# Patient Record
Sex: FEMALE | Race: WHITE | Employment: OTHER | URBAN - METROPOLITAN AREA
[De-identification: names, ages, dates, MRNs, and addresses within clinical notes are randomized per-mention and may not be internally consistent; named-entity substitution may affect disease eponyms.]

---

## 2017-02-06 ENCOUNTER — PATIENT MESSAGE (OUTPATIENT)
Dept: FAMILY MEDICINE CLINIC | Facility: CLINIC | Age: 72
End: 2017-02-06

## 2017-02-06 DIAGNOSIS — E03.9 ACQUIRED HYPOTHYROIDISM: Chronic | ICD-10-CM

## 2017-02-06 DIAGNOSIS — D50.0 IRON DEFICIENCY ANEMIA DUE TO CHRONIC BLOOD LOSS: Chronic | ICD-10-CM

## 2017-02-06 DIAGNOSIS — E78.5 HYPERLIPIDEMIA, UNSPECIFIED HYPERLIPIDEMIA TYPE: Primary | Chronic | ICD-10-CM

## 2017-02-06 NOTE — TELEPHONE ENCOUNTER
From: Jerald Gutierrez  To: Amrita Champagne MD  Sent: 2/6/2017 9:29 AM CST  Subject: Non-Urgent Medical Question    I believe I am having a thyroid problem. Currently my prescription is a 88MCG tablet Levothyroxine and the last blood test was 6/3/15.  For sever

## 2017-02-06 NOTE — TELEPHONE ENCOUNTER
LOV 3/24/16. Future Appointments  Date Time Provider Annmarie Prietoi   4/6/2017 11:00 AM April Dee MD [facility]   12/5/2017 10:50 AM Jami Ivey [facility]     Last thyroid labs were done 6/3/15.     Pt is concerned that she may be

## 2017-02-08 ENCOUNTER — LAB ENCOUNTER (OUTPATIENT)
Dept: LAB | Age: 72
End: 2017-02-08
Attending: FAMILY MEDICINE
Payer: MEDICARE

## 2017-02-08 DIAGNOSIS — D50.0 IRON DEFICIENCY ANEMIA DUE TO CHRONIC BLOOD LOSS: ICD-10-CM

## 2017-02-08 DIAGNOSIS — E03.9 ACQUIRED HYPOTHYROIDISM: Chronic | ICD-10-CM

## 2017-02-08 DIAGNOSIS — E78.5 HYPERLIPIDEMIA, UNSPECIFIED HYPERLIPIDEMIA TYPE: Chronic | ICD-10-CM

## 2017-02-08 LAB
ALBUMIN SERPL-MCNC: 4.4 G/DL (ref 3.5–4.8)
ALP LIVER SERPL-CCNC: 72 U/L (ref 55–142)
ALT SERPL-CCNC: 23 U/L (ref 14–54)
AST SERPL-CCNC: 16 U/L (ref 15–41)
BASOPHILS # BLD AUTO: 0.03 X10(3) UL (ref 0–0.1)
BASOPHILS NFR BLD AUTO: 0.7 %
BILIRUB SERPL-MCNC: 0.9 MG/DL (ref 0.1–2)
BUN BLD-MCNC: 20 MG/DL (ref 8–20)
CALCIUM BLD-MCNC: 9.6 MG/DL (ref 8.3–10.3)
CHLORIDE: 107 MMOL/L (ref 101–111)
CHOLEST SMN-MCNC: 146 MG/DL (ref ?–200)
CO2: 28 MMOL/L (ref 22–32)
CREAT BLD-MCNC: 0.82 MG/DL (ref 0.55–1.02)
EOSINOPHIL # BLD AUTO: 0.08 X10(3) UL (ref 0–0.3)
EOSINOPHIL NFR BLD AUTO: 1.8 %
ERYTHROCYTE [DISTWIDTH] IN BLOOD BY AUTOMATED COUNT: 12.4 % (ref 11.5–16)
FREE T4: 1.4 NG/DL (ref 0.9–1.8)
GLUCOSE BLD-MCNC: 90 MG/DL (ref 70–99)
HCT VFR BLD AUTO: 41.3 % (ref 34–50)
HDLC SERPL-MCNC: 102 MG/DL (ref 45–?)
HDLC SERPL: 1.43 {RATIO} (ref ?–4.44)
HGB BLD-MCNC: 14.1 G/DL (ref 12–16)
IMMATURE GRANULOCYTE COUNT: 0.01 X10(3) UL (ref 0–1)
IMMATURE GRANULOCYTE RATIO %: 0.2 %
LDLC SERPL CALC-MCNC: 33 MG/DL (ref ?–130)
LYMPHOCYTES # BLD AUTO: 1.23 X10(3) UL (ref 0.9–4)
LYMPHOCYTES NFR BLD AUTO: 28.3 %
M PROTEIN MFR SERPL ELPH: 6.9 G/DL (ref 6.1–8.3)
MCH RBC QN AUTO: 32.6 PG (ref 27–33.2)
MCHC RBC AUTO-ENTMCNC: 34.1 G/DL (ref 31–37)
MCV RBC AUTO: 95.4 FL (ref 81–100)
MONOCYTES # BLD AUTO: 0.43 X10(3) UL (ref 0.1–0.6)
MONOCYTES NFR BLD AUTO: 9.9 %
NEUTROPHIL ABS PRELIM: 2.57 X10 (3) UL (ref 1.3–6.7)
NEUTROPHILS # BLD AUTO: 2.57 X10(3) UL (ref 1.3–6.7)
NEUTROPHILS NFR BLD AUTO: 59.1 %
NONHDLC SERPL-MCNC: 44 MG/DL (ref ?–130)
PLATELET # BLD AUTO: 226 10(3)UL (ref 150–450)
POTASSIUM SERPL-SCNC: 4 MMOL/L (ref 3.6–5.1)
RBC # BLD AUTO: 4.33 X10(6)UL (ref 3.8–5.1)
RED CELL DISTRIBUTION WIDTH-SD: 43.5 FL (ref 35.1–46.3)
SODIUM SERPL-SCNC: 141 MMOL/L (ref 136–144)
TRIGLYCERIDES: 54 MG/DL (ref ?–150)
TSI SER-ACNC: 0.34 MIU/ML (ref 0.35–5.5)
VLDL: 11 MG/DL (ref 5–40)
WBC # BLD AUTO: 4.4 X10(3) UL (ref 4–13)

## 2017-03-15 ENCOUNTER — MED REC SCAN ONLY (OUTPATIENT)
Dept: FAMILY MEDICINE CLINIC | Facility: CLINIC | Age: 72
End: 2017-03-15

## 2017-03-22 ENCOUNTER — RT VISIT (OUTPATIENT)
Dept: RESPIRATORY THERAPY | Facility: HOSPITAL | Age: 72
End: 2017-03-22
Attending: INTERNAL MEDICINE
Payer: MEDICARE

## 2017-03-22 ENCOUNTER — HOSPITAL ENCOUNTER (OUTPATIENT)
Dept: GENERAL RADIOLOGY | Facility: HOSPITAL | Age: 72
Discharge: HOME | End: 2017-03-22
Attending: INTERNAL MEDICINE
Payer: MEDICARE

## 2017-03-22 DIAGNOSIS — J45.909 ASTHMATIC BRONCHITIS: ICD-10-CM

## 2017-03-22 DIAGNOSIS — R06.03 ACUTE RESPIRATORY DISTRESS: ICD-10-CM

## 2017-03-22 PROCEDURE — 94729 DIFFUSING CAPACITY: CPT

## 2017-03-22 PROCEDURE — 71020 XR CHEST PA + LAT CHEST (CPT=71020): CPT

## 2017-03-22 PROCEDURE — 94726 PLETHYSMOGRAPHY LUNG VOLUMES: CPT

## 2017-03-22 PROCEDURE — 94010 BREATHING CAPACITY TEST: CPT

## 2017-03-28 NOTE — PROCEDURES
Spirometry and flow volume loop appear normal with no evidence of airway obstruction     Spirometry and flow volume loop suggest restriction but the total lung capacity is within normal limits.   Normal TLC, no evidence of restriction  There is mild increas

## 2017-03-31 ENCOUNTER — TELEPHONE (OUTPATIENT)
Dept: FAMILY MEDICINE CLINIC | Facility: CLINIC | Age: 72
End: 2017-03-31

## 2017-03-31 NOTE — TELEPHONE ENCOUNTER
Patient completed her Annual Health Assessment Form and it is in triage for her appt with Nelly Parker on 4/6/17.

## 2017-04-06 ENCOUNTER — OFFICE VISIT (OUTPATIENT)
Dept: FAMILY MEDICINE CLINIC | Facility: CLINIC | Age: 72
End: 2017-04-06

## 2017-04-06 VITALS
HEIGHT: 67.5 IN | RESPIRATION RATE: 12 BRPM | DIASTOLIC BLOOD PRESSURE: 80 MMHG | HEART RATE: 60 BPM | BODY MASS INDEX: 19.24 KG/M2 | WEIGHT: 124 LBS | TEMPERATURE: 98 F | SYSTOLIC BLOOD PRESSURE: 112 MMHG

## 2017-04-06 DIAGNOSIS — Z23 NEED FOR VACCINATION: ICD-10-CM

## 2017-04-06 DIAGNOSIS — H81.13 BENIGN PAROXYSMAL POSITIONAL VERTIGO, BILATERAL: Chronic | ICD-10-CM

## 2017-04-06 DIAGNOSIS — H25.9 AGE-RELATED CATARACT OF BOTH EYES, UNSPECIFIED AGE-RELATED CATARACT TYPE: Chronic | ICD-10-CM

## 2017-04-06 DIAGNOSIS — L71.9 ROSACEA: ICD-10-CM

## 2017-04-06 DIAGNOSIS — J44.9 COPD, MILD (HCC): ICD-10-CM

## 2017-04-06 DIAGNOSIS — M81.0 OSTEOPOROSIS, UNSPECIFIED OSTEOPOROSIS TYPE, UNSPECIFIED PATHOLOGICAL FRACTURE PRESENCE: ICD-10-CM

## 2017-04-06 DIAGNOSIS — Z86.718 HISTORY OF VENOUS THROMBOSIS: ICD-10-CM

## 2017-04-06 DIAGNOSIS — E78.5 HYPERLIPIDEMIA, UNSPECIFIED HYPERLIPIDEMIA TYPE: Chronic | ICD-10-CM

## 2017-04-06 DIAGNOSIS — I10 HYPERTENSION GOAL BP (BLOOD PRESSURE) < 140/90: Chronic | ICD-10-CM

## 2017-04-06 DIAGNOSIS — N39.41 URGE INCONTINENCE OF URINE: Chronic | ICD-10-CM

## 2017-04-06 DIAGNOSIS — M81.0 AGE-RELATED OSTEOPOROSIS WITHOUT CURRENT PATHOLOGICAL FRACTURE: ICD-10-CM

## 2017-04-06 DIAGNOSIS — G89.29 CHRONIC MIDLINE LOW BACK PAIN WITHOUT SCIATICA: ICD-10-CM

## 2017-04-06 DIAGNOSIS — Z00.00 ANNUAL PHYSICAL EXAM: Primary | ICD-10-CM

## 2017-04-06 DIAGNOSIS — D50.0 IRON DEFICIENCY ANEMIA DUE TO CHRONIC BLOOD LOSS: Chronic | ICD-10-CM

## 2017-04-06 DIAGNOSIS — E03.9 ACQUIRED HYPOTHYROIDISM: Chronic | ICD-10-CM

## 2017-04-06 DIAGNOSIS — I73.9 PVD (PERIPHERAL VASCULAR DISEASE) (HCC): Chronic | ICD-10-CM

## 2017-04-06 DIAGNOSIS — I70.0 THORACIC AORTA ATHEROSCLEROSIS (HCC): ICD-10-CM

## 2017-04-06 DIAGNOSIS — I25.10 CORONARY ARTERY DISEASE INVOLVING NATIVE CORONARY ARTERY OF NATIVE HEART WITHOUT ANGINA PECTORIS: Chronic | ICD-10-CM

## 2017-04-06 DIAGNOSIS — Z00.00 ENCOUNTER FOR ANNUAL HEALTH EXAMINATION: ICD-10-CM

## 2017-04-06 DIAGNOSIS — M54.50 CHRONIC MIDLINE LOW BACK PAIN WITHOUT SCIATICA: ICD-10-CM

## 2017-04-06 PROCEDURE — G0009 ADMIN PNEUMOCOCCAL VACCINE: HCPCS | Performed by: FAMILY MEDICINE

## 2017-04-06 PROCEDURE — 96160 PT-FOCUSED HLTH RISK ASSMT: CPT | Performed by: FAMILY MEDICINE

## 2017-04-06 PROCEDURE — 90732 PPSV23 VACC 2 YRS+ SUBQ/IM: CPT | Performed by: FAMILY MEDICINE

## 2017-04-06 RX ORDER — LOSARTAN POTASSIUM 50 MG/1
50 TABLET ORAL DAILY
Qty: 90 TABLET | Refills: 3 | Status: SHIPPED | OUTPATIENT
Start: 2017-04-06 | End: 2018-04-11

## 2017-04-06 RX ORDER — LEVOTHYROXINE SODIUM 0.07 MG/1
75 TABLET ORAL DAILY
Qty: 90 TABLET | Refills: 3 | Status: SHIPPED | OUTPATIENT
Start: 2017-04-06 | End: 2018-03-30

## 2017-04-06 RX ORDER — MONTELUKAST SODIUM 10 MG/1
10 TABLET ORAL NIGHTLY
Qty: 90 TABLET | Refills: 3 | Status: SHIPPED | OUTPATIENT
Start: 2017-04-06 | End: 2018-04-11

## 2017-04-06 RX ORDER — ALBUTEROL SULFATE 90 UG/1
2 AEROSOL, METERED RESPIRATORY (INHALATION) EVERY 4 HOURS PRN
Qty: 18 G | Refills: 3 | Status: SHIPPED | OUTPATIENT
Start: 2017-04-06 | End: 2021-11-10

## 2017-04-06 RX ORDER — SIMVASTATIN 40 MG
40 TABLET ORAL NIGHTLY
Qty: 90 TABLET | Refills: 3 | Status: SHIPPED | OUTPATIENT
Start: 2017-04-06 | End: 2018-04-11

## 2017-04-06 RX ORDER — BACLOFEN 10 MG/1
10 TABLET ORAL 3 TIMES DAILY PRN
Qty: 30 TABLET | Refills: 0 | Status: SHIPPED | OUTPATIENT
Start: 2017-04-06 | End: 2017-05-06

## 2017-04-06 NOTE — PATIENT INSTRUCTIONS
Carney Shone Skrine's SCREENING SCHEDULE   Tests on this list are recommended by your physician but may not be covered, or covered at this frequency, by your insurer. Please check with your insurance carrier before scheduling to verify coverage.    PREVENTATIV a family history    Colorectal Cancer Screening  Covered up to Age 76     Colonoscopy Screen   Covered every 10 years- more often if abnormal Colonoscopy,10 Years due on 01/01/2016 Update Health Maintenance if applicable    Flex Sigmoidoscopy Screen  Cover -ADMIN INFLUENZA VIRUS VAC    Please get every year    Pneumococcal 13 (Prevnar)  Covered Once after 65   Orders placed or performed in visit on 05/28/15  -PNEUMOCOCCAL VACC, 13 CORY IM    Please get once after your 65th birthday    Pneumococcal 23 (Pneum

## 2017-04-06 NOTE — PROGRESS NOTES
HPI:   Javon Montalvo is a 66year old female who presents for a MA (Medicare Advantage) [facility] (Once per calendar year).     Doing fine, no new issues, and stable function  Her last annual assessment has been over 1 year: Annual Physical due on 03/24/2 Sodium 75 MCG Oral Tab Take 1 tablet (75 mcg total) by mouth daily. simvastatin 40 MG Oral Tab Take 1 tablet (40 mg total) by mouth nightly. Losartan Potassium (COZAAR) 50 MG Oral Tab Take 1 tablet (50 mg total) by mouth daily.    Montelukast Sodium 10 in her daughter; Hypertension in her maternal grandmother; Stroke in her maternal grandmother. SOCIAL HISTORY:   She  reports that she has never smoked. She has never used smokeless tobacco. She reports that she drinks alcohol.  She reports that she does appears well-developed and well-nourished. HENT:   Head: Normocephalic and atraumatic.    Right Ear: Tympanic membrane, external ear and ear canal normal.   Left Ear: Tympanic membrane, external ear and ear canal normal.   Nose: Nose normal.   Mouth/Throa PRESERVE FREE SINGLE DOSE (38650) FLU CLINIC 10/23/2015   • Depo-Medrol 40mg Inj 10/30/2013   • Influenza 11/03/2013   • Influenza Vaccine, High Dose, Preserv Free 12/03/2014, 10/10/2016   • Pneumococcal (Prevnar 13) 05/28/2015   • Pneumovax 23 (Lot Mgr) 1 ups, albuterol helps. Current Assessment & Plan     Stable, Continue present management.            Relevant Medications    Albuterol Sulfate HFA (PROAIR HFA) 108 (90 Base) MCG/ACT Inhalation Aero Soln       Endocrine    Hypothyroidism (Chronic) midline low back pain without sciatica         Relevant Medications     baclofen 10 MG Oral Tab     Other Relevant Orders     XR LUMBAR SPINE (MIN 4 VIEWS) (CPT=72110)     Encounter for annual health examination         Need for vaccination         Alisha Kemp without help    Preparing your meals: Able without help    Managing money/bills: Able without help    Taking medications as prescribed: Able without help    Are you able to afford your medications?: Yes    Hearing Problems?: No     Functional Status     He data found.     Fasting Blood Sugar (FSB)Annually   GLUCOSE (mg/dL)   Date Value   02/08/2017 90   06/03/2015 89   ----------       Cardiovascular Disease Screening     LDL Annually LDL CHOLESTEROL (mg/dL)   Date Value   02/08/2017 33     LDL-CHOLESTEROL (m No orders found for this or any previous visit. Tetanus No orders found for this or any previous visit.          SPECIFIC DISEASE MONITORING Internal Lab or Procedure External Lab or Procedure   Annual Monitoring of Persistent     Medications (ACE/ARB,

## 2017-04-07 NOTE — ASSESSMENT & PLAN NOTE
Stable, Continue present management.     Cholesterol Lowering Medications          simvastatin 40 MG Oral Tab

## 2017-04-07 NOTE — ASSESSMENT & PLAN NOTE
Stable, Continue present management.     Blood Pressure and Cardiac Medications          Losartan Potassium (COZAAR) 50 MG Oral Tab

## 2017-04-07 NOTE — ASSESSMENT & PLAN NOTE
Stable, Continue present management.     Thyroid  (most recent labs)     Lab Results  Component Value Date/Time   TSH 0.341* 02/08/2017 08:51 AM   T4F 1.4 02/08/2017 08:51 AM         Endocrine Medications          Levothyroxine Sodium 75 MCG Oral Tab

## 2017-04-12 ENCOUNTER — HOSPITAL ENCOUNTER (OUTPATIENT)
Dept: GENERAL RADIOLOGY | Facility: HOSPITAL | Age: 72
Discharge: HOME | End: 2017-04-12
Attending: FAMILY MEDICINE
Payer: MEDICARE

## 2017-04-12 DIAGNOSIS — G89.29 CHRONIC MIDLINE LOW BACK PAIN WITHOUT SCIATICA: ICD-10-CM

## 2017-04-12 DIAGNOSIS — M54.50 CHRONIC MIDLINE LOW BACK PAIN WITHOUT SCIATICA: ICD-10-CM

## 2017-04-12 DIAGNOSIS — M81.0 AGE-RELATED OSTEOPOROSIS WITHOUT CURRENT PATHOLOGICAL FRACTURE: ICD-10-CM

## 2017-04-12 PROCEDURE — 72110 X-RAY EXAM L-2 SPINE 4/>VWS: CPT

## 2017-04-28 ENCOUNTER — HOSPITAL ENCOUNTER (OUTPATIENT)
Dept: CT IMAGING | Facility: HOSPITAL | Age: 72
Discharge: HOME | End: 2017-04-28
Attending: INTERNAL MEDICINE
Payer: MEDICARE

## 2017-04-28 DIAGNOSIS — R06.00 DYSPNEA: ICD-10-CM

## 2017-04-28 PROCEDURE — 71250 CT THORAX DX C-: CPT

## 2017-06-28 ENCOUNTER — PATIENT MESSAGE (OUTPATIENT)
Dept: FAMILY MEDICINE CLINIC | Facility: CLINIC | Age: 72
End: 2017-06-28

## 2017-06-28 DIAGNOSIS — E03.9 ACQUIRED HYPOTHYROIDISM: Chronic | ICD-10-CM

## 2017-06-28 DIAGNOSIS — Z12.31 ENCOUNTER FOR SCREENING MAMMOGRAM FOR BREAST CANCER: Primary | ICD-10-CM

## 2017-06-28 NOTE — TELEPHONE ENCOUNTER
From: Oksana Gore  To: Alesha Chavis MD  Sent: 6/28/2017 1:32 PM CDT  Subject: Prescription Question    My last visit my prescription was changed to Levothyroxine 75 Mcg and recommendation was to have another blood test when refill was close to refill.

## 2017-07-05 ENCOUNTER — APPOINTMENT (OUTPATIENT)
Dept: LAB | Age: 72
End: 2017-07-05
Attending: FAMILY MEDICINE
Payer: MEDICARE

## 2017-07-05 DIAGNOSIS — E03.9 ACQUIRED HYPOTHYROIDISM: Primary | Chronic | ICD-10-CM

## 2017-07-05 DIAGNOSIS — E03.9 ACQUIRED HYPOTHYROIDISM: Chronic | ICD-10-CM

## 2017-07-05 LAB
FREE T4: 1.4 NG/DL (ref 0.9–1.8)
TSI SER-ACNC: 0.66 MIU/ML (ref 0.35–5.5)

## 2017-07-05 PROCEDURE — 36415 COLL VENOUS BLD VENIPUNCTURE: CPT | Performed by: FAMILY MEDICINE

## 2017-07-11 PROBLEM — H26.493 OTHER SECONDARY CATARACT, BILATERAL: Status: ACTIVE | Noted: 2017-07-11

## 2017-07-11 PROBLEM — H16.223 KERATOCONJUNCTIVITIS SICCA OF BOTH EYES NOT SPECIFIED AS SJOGREN'S: Status: ACTIVE | Noted: 2017-07-11

## 2017-07-11 PROBLEM — H35.033 HYPERTENSIVE RETINOPATHY OF BOTH EYES: Status: ACTIVE | Noted: 2017-07-11

## 2017-07-12 ENCOUNTER — MED REC SCAN ONLY (OUTPATIENT)
Dept: FAMILY MEDICINE CLINIC | Facility: CLINIC | Age: 72
End: 2017-07-12

## 2017-07-19 ENCOUNTER — PATIENT OUTREACH (OUTPATIENT)
Dept: CASE MANAGEMENT | Age: 72
End: 2017-07-19

## 2017-07-24 ENCOUNTER — PATIENT OUTREACH (OUTPATIENT)
Dept: CASE MANAGEMENT | Age: 72
End: 2017-07-24

## 2017-08-03 ENCOUNTER — HOSPITAL ENCOUNTER (OUTPATIENT)
Dept: MAMMOGRAPHY | Age: 72
Discharge: HOME | End: 2017-08-03
Attending: FAMILY MEDICINE
Payer: MEDICARE

## 2017-08-03 DIAGNOSIS — Z12.31 ENCOUNTER FOR SCREENING MAMMOGRAM FOR BREAST CANCER: ICD-10-CM

## 2017-08-03 PROCEDURE — 77067 SCR MAMMO BI INCL CAD: CPT | Performed by: FAMILY MEDICINE

## 2017-08-16 ENCOUNTER — PATIENT OUTREACH (OUTPATIENT)
Dept: CASE MANAGEMENT | Age: 72
End: 2017-08-16

## 2017-08-16 NOTE — PROGRESS NOTES
Returned pt's  Iona Clement (HIPPA) call introduced [place] [facility] [facility] noted of the letter the was sent with my information and we scheduled assessment.   Total time spent with patient including chart review: 5   Time spent with patient t

## 2017-08-29 ENCOUNTER — PATIENT OUTREACH (OUTPATIENT)
Dept: CASE MANAGEMENT | Age: 72
End: 2017-08-29

## 2017-08-29 NOTE — PROGRESS NOTES
Rescheduled assessment to 8/31/2017.    Total time spent with patient including chart review: 3  Time spent with patient this month: 8    Total time spent with communication and chart review this month to date: 8

## 2017-08-31 ENCOUNTER — PATIENT OUTREACH (OUTPATIENT)
Dept: CASE MANAGEMENT | Age: 72
End: 2017-08-31

## 2017-08-31 DIAGNOSIS — E78.5 HYPERLIPIDEMIA, UNSPECIFIED HYPERLIPIDEMIA TYPE: Chronic | ICD-10-CM

## 2017-08-31 DIAGNOSIS — J44.9 COPD, MILD (HCC): ICD-10-CM

## 2017-08-31 DIAGNOSIS — M81.0 OSTEOPOROSIS, UNSPECIFIED OSTEOPOROSIS TYPE, UNSPECIFIED PATHOLOGICAL FRACTURE PRESENCE: ICD-10-CM

## 2017-08-31 DIAGNOSIS — I10 HYPERTENSION GOAL BP (BLOOD PRESSURE) < 140/90: Chronic | ICD-10-CM

## 2017-08-31 DIAGNOSIS — I25.10 CORONARY ARTERY DISEASE INVOLVING NATIVE CORONARY ARTERY OF NATIVE HEART WITHOUT ANGINA PECTORIS: Chronic | ICD-10-CM

## 2017-08-31 PROCEDURE — 99490 CHRNC CARE MGMT STAFF 1ST 20: CPT

## 2017-08-31 NOTE — PROGRESS NOTES
I want to know a little about you. • What do you do for fun? Gardening and growing fruit and veggies, babysitting there grandson. • Any hobbies? What Hobbies? Gardening, cooking and baking dog bones. • What do you do for work?  Retired     Social suppo Yes   • Do you want to work on incorporating more movement or exercise into your daily routine?  No not at this time.   o (skip to goals again if so)    Disease specific:   • Do you feel you have a good understanding of your _yes__ (dm, HTN, etc)  • Would y Osteoporosis     Keratoconjunctivitis sicca of both eyes not specified as Sjogren's     Other secondary cataract, bilateral     Hypertensive retinopathy of both eyes      Interventions for following categories based on assessment  Health Maintenance: pt re and walking. Care Plan: Reviewed and updated where needed  Time Spent This Encounter Total: 31 min medical record review, telephone communication, care plan updates where needed, and education.  Provided acknowledgment and validation to patient's concerns

## 2017-09-22 ENCOUNTER — PATIENT OUTREACH (OUTPATIENT)
Dept: CASE MANAGEMENT | Age: 72
End: 2017-09-22

## 2017-09-22 DIAGNOSIS — J44.9 COPD, MILD (HCC): ICD-10-CM

## 2017-09-22 DIAGNOSIS — M81.0 OSTEOPOROSIS, UNSPECIFIED OSTEOPOROSIS TYPE, UNSPECIFIED PATHOLOGICAL FRACTURE PRESENCE: ICD-10-CM

## 2017-09-22 PROCEDURE — 99490 CHRNC CARE MGMT STAFF 1ST 20: CPT

## 2017-09-22 NOTE — PROGRESS NOTES
9/22/2017  Spoke to Mandy Evergreen at length about CCM, current care plan and reviewed meds and compliance.  Reviewed Patient Active Problem List:     Benign paroxysmal positional vertigo     COPD, mild (HCC)     Hyperlipidemia     Hypertension goal BP (blood pressu effects.     Your appointments     Date & Time Appointment Department [facility])    Dec 05, 2017 10:50 AM CST FOLLOW UP with MD LUIS Villarreal CARDIOLOGY [place] at [facility]        [address] at [facility] [address] Condetiffany Laboy

## 2017-10-24 ENCOUNTER — PATIENT OUTREACH (OUTPATIENT)
Dept: CASE MANAGEMENT | Age: 72
End: 2017-10-24

## 2017-11-07 ENCOUNTER — PATIENT OUTREACH (OUTPATIENT)
Dept: CASE MANAGEMENT | Age: 72
End: 2017-11-07

## 2018-03-30 DIAGNOSIS — E03.9 ACQUIRED HYPOTHYROIDISM: Chronic | ICD-10-CM

## 2018-04-02 RX ORDER — LEVOTHYROXINE SODIUM 0.07 MG/1
TABLET ORAL
Qty: 90 TABLET | Refills: 0 | Status: SHIPPED | OUTPATIENT
Start: 2018-04-02 | End: 2018-04-11

## 2018-04-02 NOTE — TELEPHONE ENCOUNTER
Incoming request for levothyroxine. LOV 4/6/2017 and last labs done 7/5/2017.  Future Appointments  Date Time Provider Annmarie Heidi   4/11/2018 10:00 AM Carmencita Omalley MD [facility]   12/11/2018 10:50 AM Yocasta Gant  [address]

## 2018-04-05 ENCOUNTER — TELEPHONE (OUTPATIENT)
Dept: FAMILY MEDICINE CLINIC | Facility: CLINIC | Age: 73
End: 2018-04-05

## 2018-04-05 NOTE — TELEPHONE ENCOUNTER
Spoke with patient and she said she was just running out to get her grandson she is available from 9am - 2 pm please call back tomorrow during those times.

## 2018-04-09 PROBLEM — M47.816 FACET ARTHRITIS OF LUMBAR REGION: Status: ACTIVE | Noted: 2018-04-09

## 2018-04-09 PROBLEM — H26.493 OTHER SECONDARY CATARACT, BILATERAL: Status: RESOLVED | Noted: 2017-07-11 | Resolved: 2018-04-09

## 2018-04-10 NOTE — ASSESSMENT & PLAN NOTE
Stable, Continue present management.     Cholesterol Lowering Medications          simvastatin 40 MG Oral Tab        Continue to follow

## 2018-04-10 NOTE — ASSESSMENT & PLAN NOTE
Stable, Continue present management.     Thyroid  (most recent labs)     Lab Results  Component Value Date/Time   TSH 0.656 07/05/2017 11:22 AM   T4F 1.4 07/05/2017 11:22 AM         Endocrine Medications          LEVOTHYROXINE SODIUM 75 MCG Oral Tab

## 2018-04-11 ENCOUNTER — OFFICE VISIT (OUTPATIENT)
Dept: FAMILY MEDICINE CLINIC | Facility: CLINIC | Age: 73
End: 2018-04-11

## 2018-04-11 VITALS
RESPIRATION RATE: 14 BRPM | SYSTOLIC BLOOD PRESSURE: 106 MMHG | BODY MASS INDEX: 18.64 KG/M2 | WEIGHT: 123 LBS | TEMPERATURE: 97 F | DIASTOLIC BLOOD PRESSURE: 66 MMHG | HEART RATE: 72 BPM | HEIGHT: 68 IN

## 2018-04-11 DIAGNOSIS — M47.816 FACET ARTHRITIS OF LUMBAR REGION: ICD-10-CM

## 2018-04-11 DIAGNOSIS — H35.033 HYPERTENSIVE RETINOPATHY OF BOTH EYES: ICD-10-CM

## 2018-04-11 DIAGNOSIS — L71.9 ROSACEA: ICD-10-CM

## 2018-04-11 DIAGNOSIS — E78.00 PURE HYPERCHOLESTEROLEMIA: Chronic | ICD-10-CM

## 2018-04-11 DIAGNOSIS — N39.41 URGE INCONTINENCE OF URINE: Chronic | ICD-10-CM

## 2018-04-11 DIAGNOSIS — H16.223 KERATOCONJUNCTIVITIS SICCA OF BOTH EYES NOT SPECIFIED AS SJOGREN'S: ICD-10-CM

## 2018-04-11 DIAGNOSIS — I70.0 THORACIC AORTA ATHEROSCLEROSIS (HCC): ICD-10-CM

## 2018-04-11 DIAGNOSIS — H25.9 AGE-RELATED CATARACT OF BOTH EYES, UNSPECIFIED AGE-RELATED CATARACT TYPE: Chronic | ICD-10-CM

## 2018-04-11 DIAGNOSIS — Z12.31 VISIT FOR SCREENING MAMMOGRAM: ICD-10-CM

## 2018-04-11 DIAGNOSIS — I10 HYPERTENSION GOAL BP (BLOOD PRESSURE) < 140/90: Chronic | ICD-10-CM

## 2018-04-11 DIAGNOSIS — H81.13 BENIGN PAROXYSMAL POSITIONAL VERTIGO DUE TO BILATERAL VESTIBULAR DISORDER: Chronic | ICD-10-CM

## 2018-04-11 DIAGNOSIS — M81.0 AGE-RELATED OSTEOPOROSIS WITHOUT CURRENT PATHOLOGICAL FRACTURE: ICD-10-CM

## 2018-04-11 DIAGNOSIS — D50.0 IRON DEFICIENCY ANEMIA DUE TO CHRONIC BLOOD LOSS: Chronic | ICD-10-CM

## 2018-04-11 DIAGNOSIS — Z86.718 HISTORY OF VENOUS THROMBOSIS: ICD-10-CM

## 2018-04-11 DIAGNOSIS — I25.10 CORONARY ARTERY DISEASE INVOLVING NATIVE CORONARY ARTERY OF NATIVE HEART WITHOUT ANGINA PECTORIS: Chronic | ICD-10-CM

## 2018-04-11 DIAGNOSIS — Z00.00 ANNUAL PHYSICAL EXAM: Primary | ICD-10-CM

## 2018-04-11 DIAGNOSIS — E03.9 ACQUIRED HYPOTHYROIDISM: Chronic | ICD-10-CM

## 2018-04-11 DIAGNOSIS — J44.9 COPD, MILD (HCC): ICD-10-CM

## 2018-04-11 PROBLEM — R06.03 ACUTE RESPIRATORY DISTRESS: Status: RESOLVED | Noted: 2018-04-11 | Resolved: 2018-04-11

## 2018-04-11 PROBLEM — R06.03 ACUTE RESPIRATORY DISTRESS: Status: ACTIVE | Noted: 2018-04-11

## 2018-04-11 PROCEDURE — G0439 PPPS, SUBSEQ VISIT: HCPCS | Performed by: FAMILY MEDICINE

## 2018-04-11 PROCEDURE — 96160 PT-FOCUSED HLTH RISK ASSMT: CPT | Performed by: FAMILY MEDICINE

## 2018-04-11 RX ORDER — SIMVASTATIN 40 MG
40 TABLET ORAL NIGHTLY
Qty: 90 TABLET | Refills: 3 | Status: SHIPPED | OUTPATIENT
Start: 2018-04-11 | End: 2019-04-16

## 2018-04-11 RX ORDER — MONTELUKAST SODIUM 10 MG/1
10 TABLET ORAL NIGHTLY
Qty: 90 TABLET | Refills: 3 | Status: SHIPPED | OUTPATIENT
Start: 2018-04-11 | End: 2019-04-11

## 2018-04-11 RX ORDER — LEVOTHYROXINE SODIUM 0.07 MG/1
75 TABLET ORAL
Qty: 90 TABLET | Refills: 3 | Status: SHIPPED | OUTPATIENT
Start: 2018-04-11 | End: 2019-06-21

## 2018-04-11 RX ORDER — ERGOCALCIFEROL 1.25 MG/1
50000 CAPSULE ORAL
Qty: 3 CAPSULE | Status: SHIPPED | OUTPATIENT
Start: 2018-04-11 | End: 2019-04-11

## 2018-04-11 RX ORDER — LOSARTAN POTASSIUM 50 MG/1
50 TABLET ORAL DAILY
Qty: 90 TABLET | Refills: 3 | Status: SHIPPED | OUTPATIENT
Start: 2018-04-11 | End: 2019-05-07

## 2018-04-11 NOTE — PROGRESS NOTES
HPI:   Deonte Bo is a 74year old female who presents for a MA (Medicare Advantage) [facility] (Once per calendar year). Cold all the time.  Nl thyroid and circulation  Her last annual assessment has been over 1 year: Annual Physical due on 04/06/20 takes aspirin and has it on her medication list.   CAGE Alcohol screening   Tiffany Hernandez was screened for Alcohol abuse and had a score of 0 so is at low risk.     Patient Care Team: Patient Care Team:  Carmencita Omalley MD as PCP - Aspen Guillory MD [salmeterol xinafoate]; sulfa antibiotics; symbicort; triamterene; and nexium.     CURRENT MEDICATIONS:     Outpatient Prescriptions Marked as Taking for the 4/11/18 encounter (Office Visit) with Aarti Stevens MD:  ergocalciferol 13877 units Oral Cap Take 1 surgery (0271,1776); arthroscopy of joint unlisted (06/2010); Cardiac catheterization (5/2010); elisa needle localization w/ specimen 1 site right (Right, 1980); and elisa needle localization w/ specimen 2 site right (Right, 1993).     Her family history includ Whisper Test  Whisper Test Result:  Fail             Visual Acuity  Right Eye Visual Acuity: Corrected Right Eye Chart Acuity: 20/40   Left Eye Visual Acuity: Corrected Left Eye Chart Acuity: 20/50   Both Eyes Visual Acuity: Corrected Both Eyes Chart Acuit deficit. Skin: Skin is warm, dry and intact. No rash noted. She is not diaphoretic. No cyanosis or erythema. Nails show no clubbing. Psychiatric: She has a normal mood and affect.  Her speech is normal and behavior is normal. Judgment and thought conten Relevant Orders    CARDIO - INTERNAL    Thoracic aorta atherosclerosis ([address]    Overview     CT angiogram showed mild thoracic atherosclerosis in 2015 as well as hx of Occluded rt sc artery.           Current Assessment & Plan     Stable, Continue present m Dr. John Davis, Continue present management.            Relevant Orders    OPHTHALMOLOGY - INTERNAL       Integumentary    Rosacea    Overview     Metronidazole 0.75% cream PRB         Current Assessment & Plan flowsheet data found.     Fasting Blood Sugar (FSB)Annually   Glucose (mg/dL)   Date Value   02/08/2017 90   ----------  GLUCOSE (mg/dL)   Date Value   06/03/2015 89   ----------       Cardiovascular Disease Screening     LDL Annually LDL-CHOLESTEROL (mg/dL Hepatitis B for Moderate/High Risk No vaccine history found Medium/high risk factors:   End-stage renal disease   Hemophiliacs who received Factor VIII or IX concentrates   Clients of institutions for the mentally retarded   Persons who live in the same [place]

## 2018-04-11 NOTE — PATIENT INSTRUCTIONS
TOP FALL PREVENTION TIPS    INSIDE YOUR HOME   KITCHEN:  · Use non skid mats only. · Clean up spills as soon as they happen. · Keep objects that you use often within easy reach.   BATHROOM:  · Install grab bars on the bathroom walls beside the tub, sh HbgA1C    At Least  Annually for Diabetics No results found for: A1C No flowsheet data found.     Fasting Blood Sugar (FSB)   Patient must be diagnosed with one of the following:   • Hypertension   • Dyslipidemia   • Obesity (BMI ³30 kg/m2)   • Previous Covered every 5 years No results found for this or any previous visit. No flowsheet data found. Fecal Occult Blood   Covered Annually No results found for: FOB, OCCULTSTOOL No flowsheet data found.      Barium Enema-   uncomfortable but covered  Covere (Pneumovax)  Covered Once after 65   Orders placed or performed in visit on 04/06/17  -PNEUMOCOCCAL IMM (PNEUMOVAX)    Please get once after your 65th birthday    Hepatitis B for Moderate/High Risk       No orders found for this or any previous visit.  Medi Lab or Procedure External Lab or Procedure   Diabetes Screening      HbgA1C    At Least  Annually for Diabetics No results found for: A1C No flowsheet data found.     Fasting Blood Sugar (FSB)   Patient must be diagnosed with one of the following:   • Hyper Update Health Maintenance if applicable    Flex Sigmoidoscopy Screen  Covered every 5 years No results found for this or any previous visit. No flowsheet data found.      Fecal Occult Blood   Covered Annually No results found for: FOB, OCCULTSTOOL No flowsh IM    Please get once after your 65th birthday    Pneumococcal 23 (Pneumovax)  Covered Once after 65   Orders placed or performed in visit on 04/06/17  -PNEUMOCOCCAL IMM (PNEUMOVAX)    Please get once after your 65th birthday    Hepatitis B for Moderate/Hi

## 2018-08-04 ENCOUNTER — HOSPITAL ENCOUNTER (OUTPATIENT)
Dept: MAMMOGRAPHY | Age: 73
Discharge: HOME | End: 2018-08-04
Attending: FAMILY MEDICINE
Payer: MEDICARE

## 2018-08-04 DIAGNOSIS — Z12.31 VISIT FOR SCREENING MAMMOGRAM: ICD-10-CM

## 2018-08-04 PROCEDURE — 77067 SCR MAMMO BI INCL CAD: CPT | Performed by: FAMILY MEDICINE

## 2018-08-04 PROCEDURE — 77063 BREAST TOMOSYNTHESIS BI: CPT | Performed by: FAMILY MEDICINE

## 2018-11-04 ENCOUNTER — PATIENT MESSAGE (OUTPATIENT)
Dept: FAMILY MEDICINE CLINIC | Facility: CLINIC | Age: 73
End: 2018-11-04

## 2018-11-04 DIAGNOSIS — Z12.11 COLON CANCER SCREENING: Primary | ICD-10-CM

## 2018-11-05 NOTE — TELEPHONE ENCOUNTER
From: Maxine Cash  To: Aminta Goddard MD  Sent: 11/4/2018 10:46 AM CST  Subject: Non-Urgent Medical Question    Influenza injection was administered at [facility] 10/10/2018  Never knew I was to have a stool sample tested

## 2018-11-26 ENCOUNTER — OFFICE VISIT (OUTPATIENT)
Dept: FAMILY MEDICINE CLINIC | Facility: CLINIC | Age: 73
End: 2018-11-26

## 2018-11-26 VITALS
RESPIRATION RATE: 12 BRPM | HEIGHT: 66 IN | HEART RATE: 56 BPM | BODY MASS INDEX: 19.93 KG/M2 | DIASTOLIC BLOOD PRESSURE: 78 MMHG | WEIGHT: 124 LBS | SYSTOLIC BLOOD PRESSURE: 126 MMHG | TEMPERATURE: 98 F

## 2018-11-26 DIAGNOSIS — M25.562 ACUTE PAIN OF LEFT KNEE: ICD-10-CM

## 2018-11-26 DIAGNOSIS — Z13.21 ENCOUNTER FOR VITAMIN DEFICIENCY SCREENING: Primary | ICD-10-CM

## 2018-11-26 DIAGNOSIS — G89.29 CHRONIC LEFT HIP PAIN: ICD-10-CM

## 2018-11-26 DIAGNOSIS — M25.552 CHRONIC LEFT HIP PAIN: ICD-10-CM

## 2018-11-26 DIAGNOSIS — K13.0 CHEILITIS: ICD-10-CM

## 2018-11-26 DIAGNOSIS — Z91.81 AT HIGH RISK FOR FALLS: ICD-10-CM

## 2018-11-26 PROCEDURE — 99214 OFFICE O/P EST MOD 30 MIN: CPT | Performed by: FAMILY MEDICINE

## 2018-11-26 NOTE — PROGRESS NOTES
Kevin Folres is a 74year old female. S:  Patient presents today with the following concerns:  · Left shoulder pain 2 months ago and then went away. · Left hip pain-tender spot to the touch. Some pain at night when rolls over.     · Then left knee st Osteoporosis     Keratoconjunctivitis sicca of both eyes not specified as Sjogren's     Hypertensive retinopathy of both eyes     Facet arthritis of lumbar region [facility])    Family History   Problem Relation Age of Onset   • Diabetes Sister    • Breast Cance Consults:  XR KNEE BILAT STANDING (CPT=73565)  XR HIP W OR WO PELVIS 2 VIEWS BILAT(CPT=73521)    Labs ordered as above due to her lip pain. Recommend Aquaphor oint in the meantime. X-rays ordered. Likely arthritis. Trial of glucosamine chondroitin.

## 2018-11-26 NOTE — PATIENT INSTRUCTIONS
Trial of glucosamine chondroitin. Aquaphor ointment for lips. Knee Pain  Knee pain is very common. It’s especially common in active people who put a lot of pressure on their knees, like runners. It affects women more often than men.   Your kneec also sometimes feel like your knee is giving out. You may have symptoms in one or both of your knees. Diagnosing knee pain  Your healthcare provider will ask about your medical history and your symptoms.  Be sure to describe any activities that make your k and after exercise  · Replace your running shoes regularly  · Lose excess weight     When to call your healthcare provider  Call your healthcare provider right away if:  · Your symptoms don’t get better after a few weeks of treatment  · You have any new sy program.  · Use anti-inflammatory medicines as prescribed for pain. This includes acetaminophen or NSAIDs such as ibuprofen or naproxen. If needed, topical or injected medicines may be recommended.  Talk to your healthcare provider if these options are not sports, such as walking, biking, or doing exercises in a warm pool. · Most people should exercise for at least 30 minutes a day on most days of the week. This can be broken up into shorter periods throughout the day.   · Don’t push yourself too hard at [facility] NSAIDs (nonsteroidal anti-inflammatory medicines) such as ibuprofen and naproxen, or opioid narcotics  · Injections in affected joints such as corticosteroids in various joints, or hyaluronic acid in the knee joints  · Surgical repair or surgical joint rep

## 2018-11-28 ENCOUNTER — HOSPITAL ENCOUNTER (OUTPATIENT)
Dept: GENERAL RADIOLOGY | Age: 73
Discharge: HOME | End: 2018-11-28
Attending: FAMILY MEDICINE
Payer: MEDICARE

## 2018-11-28 ENCOUNTER — LAB ENCOUNTER (OUTPATIENT)
Dept: LAB | Age: 73
End: 2018-11-28
Attending: FAMILY MEDICINE
Payer: MEDICARE

## 2018-11-28 DIAGNOSIS — M25.562 ACUTE PAIN OF LEFT KNEE: ICD-10-CM

## 2018-11-28 DIAGNOSIS — K13.0 CHEILITIS: ICD-10-CM

## 2018-11-28 DIAGNOSIS — M25.552 CHRONIC LEFT HIP PAIN: ICD-10-CM

## 2018-11-28 DIAGNOSIS — G89.29 CHRONIC LEFT HIP PAIN: ICD-10-CM

## 2018-11-28 DIAGNOSIS — Z13.21 ENCOUNTER FOR VITAMIN DEFICIENCY SCREENING: ICD-10-CM

## 2018-11-28 DIAGNOSIS — Z91.81 AT HIGH RISK FOR FALLS: ICD-10-CM

## 2018-11-28 PROCEDURE — 73565 X-RAY EXAM OF KNEES: CPT | Performed by: FAMILY MEDICINE

## 2018-11-28 PROCEDURE — 82607 VITAMIN B-12: CPT

## 2018-11-28 PROCEDURE — 73523 X-RAY EXAM HIPS BI 5/> VIEWS: CPT | Performed by: FAMILY MEDICINE

## 2018-11-28 PROCEDURE — 82306 VITAMIN D 25 HYDROXY: CPT

## 2018-11-28 PROCEDURE — 36415 COLL VENOUS BLD VENIPUNCTURE: CPT

## 2018-12-03 ENCOUNTER — TELEPHONE (OUTPATIENT)
Dept: FAMILY MEDICINE CLINIC | Facility: CLINIC | Age: 73
End: 2018-12-03

## 2018-12-03 DIAGNOSIS — G89.29 CHRONIC LEFT HIP PAIN: ICD-10-CM

## 2018-12-03 DIAGNOSIS — M25.552 CHRONIC LEFT HIP PAIN: ICD-10-CM

## 2018-12-03 DIAGNOSIS — M25.562 ACUTE PAIN OF LEFT KNEE: Primary | ICD-10-CM

## 2018-12-03 NOTE — TELEPHONE ENCOUNTER
----- Message from Chrissy Erwin PA-C sent at 11/28/2018 12:42 PM CST -----  Mild osteoarthritis. Pelvic tilt with right hip higher than left. This could be related to a scoliosis or leg length discrepancy.   Let's refer her to Dr. Mega Foote for [facility]

## 2018-12-05 NOTE — TELEPHONE ENCOUNTER
Patient notified of approved referral to Dr. Jannie Fung M.D. Patient was also given his [place] phone number to call.

## 2018-12-05 NOTE — TELEPHONE ENCOUNTER
Referral request Dr. Eloy Jones M.D.,Orthopedic surgery  Patient seen by Bambi Sharma PA-C 11/26/18  Office notes from MAXINE DumasC11/26/18  Imaging & Consults:  XR KNEE BILAT STANDING (CPT=73565)  XR HIP W OR WO PELVIS 2 VIEWS BILAT(CPT=73521

## 2018-12-13 ENCOUNTER — TELEPHONE (OUTPATIENT)
Dept: FAMILY MEDICINE CLINIC | Facility: CLINIC | Age: 73
End: 2018-12-13

## 2018-12-13 DIAGNOSIS — I70.0 THORACIC AORTA ATHEROSCLEROSIS (HCC): ICD-10-CM

## 2018-12-13 DIAGNOSIS — E55.9 VITAMIN D DEFICIENCY: ICD-10-CM

## 2018-12-13 DIAGNOSIS — E78.00 PURE HYPERCHOLESTEROLEMIA: Chronic | ICD-10-CM

## 2018-12-13 DIAGNOSIS — E53.8 LOW VITAMIN B12 LEVEL: Primary | ICD-10-CM

## 2018-12-13 DIAGNOSIS — I25.10 CORONARY ARTERY DISEASE INVOLVING NATIVE CORONARY ARTERY OF NATIVE HEART WITHOUT ANGINA PECTORIS: Chronic | ICD-10-CM

## 2018-12-13 DIAGNOSIS — I10 HYPERTENSION GOAL BP (BLOOD PRESSURE) < 140/90: Chronic | ICD-10-CM

## 2018-12-13 RX ORDER — MULTIVITAMIN/IRON/FOLIC ACID 18MG-0.4MG
TABLET ORAL
COMMUNITY
Start: 2018-12-03 | End: 2020-07-20

## 2018-12-14 RX ORDER — ERGOCALCIFEROL (VITAMIN D2) 50 MCG
1 CAPSULE ORAL DAILY
COMMUNITY
Start: 2018-12-14 | End: 2019-04-10 | Stop reason: ALTCHOICE

## 2018-12-14 RX ORDER — MELATONIN
1000 2 TIMES DAILY
COMMUNITY
Start: 2018-12-14 | End: 2020-03-10

## 2018-12-14 NOTE — TELEPHONE ENCOUNTER
Spoke with Reginald Villa giving her Richard Austin's suggestions to take Vitamin D 2000 iu daily and Vitamin B12 1000 mcg daily. Reginald Villa verbalized understanding.

## 2018-12-14 NOTE — TELEPHONE ENCOUNTER
Yes, have her take the last high dose vitamin D and the daily 2,000 IU Vit D once daily. She should be taking vitamin B12 1,000 mcg daily for the low B12.

## 2018-12-14 NOTE — TELEPHONE ENCOUNTER
Spoke with Claire Flores, telling her she needs to have CMP and Lipids done. Orders have been entered, a 12 hour fast is required, water is allowed. She verbalized understanding     We also discussed Vitamin B12 and Vitamin D levels.   Anai Austin PA-C made th

## 2018-12-14 NOTE — PROGRESS NOTES
Discussed test results with Jackelyn Oropeza by phone telling her her [id number] and Vitamin D are low per [facility]. She recommends Vitamin B12 1000 mcg once daily and Vitamin D 2000 iu once daily. Recheck both in 2-3 months.   Patient verbalizes understand

## 2018-12-14 NOTE — TELEPHONE ENCOUNTER
Filed: 12/13/2018  8:32 AM Encounter Date: 12/11/2018 Status: Signed   : Homer Hui (Physician Assistant)          Patient has not had lipids or CMP since 2017. Needs this done.

## 2018-12-17 ENCOUNTER — APPOINTMENT (OUTPATIENT)
Dept: LAB | Age: 73
End: 2018-12-17
Attending: FAMILY MEDICINE
Payer: MEDICARE

## 2018-12-17 DIAGNOSIS — I25.10 CORONARY ARTERY DISEASE INVOLVING NATIVE CORONARY ARTERY OF NATIVE HEART WITHOUT ANGINA PECTORIS: Chronic | ICD-10-CM

## 2018-12-17 DIAGNOSIS — E78.00 PURE HYPERCHOLESTEROLEMIA: Chronic | ICD-10-CM

## 2018-12-17 DIAGNOSIS — I10 HYPERTENSION GOAL BP (BLOOD PRESSURE) < 140/90: Chronic | ICD-10-CM

## 2018-12-17 DIAGNOSIS — I70.0 THORACIC AORTA ATHEROSCLEROSIS (HCC): ICD-10-CM

## 2018-12-17 PROCEDURE — 80061 LIPID PANEL: CPT

## 2018-12-17 PROCEDURE — 36415 COLL VENOUS BLD VENIPUNCTURE: CPT

## 2018-12-17 PROCEDURE — 80053 COMPREHEN METABOLIC PANEL: CPT

## 2019-02-13 ENCOUNTER — TELEPHONE (OUTPATIENT)
Dept: FAMILY MEDICINE CLINIC | Facility: CLINIC | Age: 74
End: 2019-02-13

## 2019-04-01 ENCOUNTER — PATIENT MESSAGE (OUTPATIENT)
Dept: FAMILY MEDICINE CLINIC | Facility: CLINIC | Age: 74
End: 2019-04-01

## 2019-04-01 DIAGNOSIS — E03.9 ACQUIRED HYPOTHYROIDISM: Primary | Chronic | ICD-10-CM

## 2019-04-01 DIAGNOSIS — E61.1 IRON DEFICIENCY: ICD-10-CM

## 2019-04-01 DIAGNOSIS — Z00.00 LABORATORY EXAMINATION ORDERED AS PART OF A ROUTINE GENERAL MEDICAL EXAMINATION: ICD-10-CM

## 2019-04-01 DIAGNOSIS — D50.0 IRON DEFICIENCY ANEMIA DUE TO CHRONIC BLOOD LOSS: Chronic | ICD-10-CM

## 2019-04-01 NOTE — TELEPHONE ENCOUNTER
From: Alexia Human  To: April Dee MD  Sent: 4/1/2019 9:40 AM CDT  Subject: Non-Urgent Medical Question    Do I need any blood test prior my 4/10 wellness exam?  Thank you

## 2019-04-02 ENCOUNTER — APPOINTMENT (OUTPATIENT)
Dept: LAB | Age: 74
End: 2019-04-02
Attending: FAMILY MEDICINE
Payer: MEDICARE

## 2019-04-02 DIAGNOSIS — D50.0 IRON DEFICIENCY ANEMIA DUE TO CHRONIC BLOOD LOSS: Chronic | ICD-10-CM

## 2019-04-02 DIAGNOSIS — E03.9 ACQUIRED HYPOTHYROIDISM: Chronic | ICD-10-CM

## 2019-04-02 DIAGNOSIS — Z00.00 LABORATORY EXAMINATION ORDERED AS PART OF A ROUTINE GENERAL MEDICAL EXAMINATION: ICD-10-CM

## 2019-04-02 DIAGNOSIS — E61.1 IRON DEFICIENCY: ICD-10-CM

## 2019-04-02 PROCEDURE — 80053 COMPREHEN METABOLIC PANEL: CPT

## 2019-04-02 PROCEDURE — 83540 ASSAY OF IRON: CPT

## 2019-04-02 PROCEDURE — 82728 ASSAY OF FERRITIN: CPT

## 2019-04-02 PROCEDURE — 84443 ASSAY THYROID STIM HORMONE: CPT

## 2019-04-02 PROCEDURE — 80061 LIPID PANEL: CPT

## 2019-04-02 PROCEDURE — 36415 COLL VENOUS BLD VENIPUNCTURE: CPT

## 2019-04-02 PROCEDURE — 83550 IRON BINDING TEST: CPT

## 2019-04-02 PROCEDURE — 85027 COMPLETE CBC AUTOMATED: CPT

## 2019-04-04 ENCOUNTER — TELEPHONE (OUTPATIENT)
Dept: FAMILY MEDICINE CLINIC | Facility: CLINIC | Age: 74
End: 2019-04-04

## 2019-04-08 ENCOUNTER — MA CHART PREP (OUTPATIENT)
Dept: FAMILY MEDICINE CLINIC | Facility: CLINIC | Age: 74
End: 2019-04-08

## 2019-04-08 PROBLEM — I73.9 PVD (PERIPHERAL VASCULAR DISEASE) (HCC): Status: ACTIVE | Noted: 2019-04-08

## 2019-04-09 NOTE — ASSESSMENT & PLAN NOTE
Stable, Continue present management.     Thyroid  (most recent labs)   Lab Results   Component Value Date/Time    TSH 0.489 04/02/2019 08:38 AM    T4F 1.4 07/05/2017 11:22 AM    TSHT4 1.13 04/11/2014 08:51 AM         Endocrine Medications          Levothyro

## 2019-04-10 ENCOUNTER — OFFICE VISIT (OUTPATIENT)
Dept: FAMILY MEDICINE CLINIC | Facility: CLINIC | Age: 74
End: 2019-04-10
Payer: MEDICARE

## 2019-04-10 VITALS
BODY MASS INDEX: 18.83 KG/M2 | SYSTOLIC BLOOD PRESSURE: 122 MMHG | HEIGHT: 67 IN | WEIGHT: 120 LBS | HEART RATE: 68 BPM | DIASTOLIC BLOOD PRESSURE: 74 MMHG | TEMPERATURE: 98 F | RESPIRATION RATE: 12 BRPM

## 2019-04-10 DIAGNOSIS — M47.816 FACET ARTHRITIS OF LUMBAR REGION: ICD-10-CM

## 2019-04-10 DIAGNOSIS — I10 ESSENTIAL HYPERTENSION: ICD-10-CM

## 2019-04-10 DIAGNOSIS — Z00.00 ENCOUNTER FOR ANNUAL HEALTH EXAMINATION: ICD-10-CM

## 2019-04-10 DIAGNOSIS — N39.41 URGE INCONTINENCE OF URINE: Chronic | ICD-10-CM

## 2019-04-10 DIAGNOSIS — H81.13 BENIGN PAROXYSMAL POSITIONAL VERTIGO DUE TO BILATERAL VESTIBULAR DISORDER: Chronic | ICD-10-CM

## 2019-04-10 DIAGNOSIS — I25.10 CORONARY ARTERY DISEASE INVOLVING NATIVE CORONARY ARTERY OF NATIVE HEART WITHOUT ANGINA PECTORIS: Chronic | ICD-10-CM

## 2019-04-10 DIAGNOSIS — H25.9 AGE-RELATED CATARACT OF BOTH EYES, UNSPECIFIED AGE-RELATED CATARACT TYPE: Chronic | ICD-10-CM

## 2019-04-10 DIAGNOSIS — M81.0 AGE-RELATED OSTEOPOROSIS WITHOUT CURRENT PATHOLOGICAL FRACTURE: ICD-10-CM

## 2019-04-10 DIAGNOSIS — I73.9 PVD (PERIPHERAL VASCULAR DISEASE) (HCC): ICD-10-CM

## 2019-04-10 DIAGNOSIS — D50.0 IRON DEFICIENCY ANEMIA DUE TO CHRONIC BLOOD LOSS: Chronic | ICD-10-CM

## 2019-04-10 DIAGNOSIS — H91.93 BILATERAL HEARING LOSS, UNSPECIFIED HEARING LOSS TYPE: ICD-10-CM

## 2019-04-10 DIAGNOSIS — E03.9 ACQUIRED HYPOTHYROIDISM: Chronic | ICD-10-CM

## 2019-04-10 DIAGNOSIS — H16.223 KERATOCONJUNCTIVITIS SICCA OF BOTH EYES NOT SPECIFIED AS SJOGREN'S: ICD-10-CM

## 2019-04-10 DIAGNOSIS — E78.00 PURE HYPERCHOLESTEROLEMIA: Chronic | ICD-10-CM

## 2019-04-10 DIAGNOSIS — Z00.00 ANNUAL PHYSICAL EXAM: Primary | ICD-10-CM

## 2019-04-10 DIAGNOSIS — H35.033 HYPERTENSIVE RETINOPATHY OF BOTH EYES: ICD-10-CM

## 2019-04-10 DIAGNOSIS — L71.9 ROSACEA: ICD-10-CM

## 2019-04-10 DIAGNOSIS — J44.9 COPD, MILD (HCC): ICD-10-CM

## 2019-04-10 DIAGNOSIS — Z86.718 HISTORY OF VENOUS THROMBOSIS: ICD-10-CM

## 2019-04-10 DIAGNOSIS — I70.0 THORACIC AORTA ATHEROSCLEROSIS (HCC): ICD-10-CM

## 2019-04-10 DIAGNOSIS — Z78.0 POSTMENOPAUSAL: ICD-10-CM

## 2019-04-10 PROCEDURE — G0438 PPPS, INITIAL VISIT: HCPCS | Performed by: FAMILY MEDICINE

## 2019-04-10 PROCEDURE — 96160 PT-FOCUSED HLTH RISK ASSMT: CPT | Performed by: FAMILY MEDICINE

## 2019-04-10 PROCEDURE — 99397 PER PM REEVAL EST PAT 65+ YR: CPT | Performed by: FAMILY MEDICINE

## 2019-04-10 RX ORDER — ERGOCALCIFEROL 1.25 MG/1
50000 CAPSULE ORAL
COMMUNITY
End: 2019-04-16

## 2019-04-10 NOTE — PROGRESS NOTES
HPI:   Deonte Bo is a 74year old female who presents for a MA (Medicare Advantage) [facility] (Once per calendar year). Lips continue to be sore.  She got topical stuff this spring without help  Annual Physical due on 04/11/2019    asking about swi Osteoporosis     Keratoconjunctivitis sicca of both eyes not specified as Sjogren's     Hypertensive retinopathy of both eyes     Facet arthritis of lumbar region [facility])     PVD (peripheral vascular disease) ([facility] [address]    Wt Readings from Last 3 Encounters:  04/ (10 mg total) by mouth nightly. simvastatin 40 MG Oral Tab Take 1 tablet (40 mg total) by mouth nightly. Albuterol Sulfate HFA (PROAIR HFA) 108 (90 Base) MCG/ACT Inhalation Aero Soln Inhale 2 puffs into the lungs every 4 (four) hours as needed.    Fexof Constitutional: Negative. Negative for activity change, appetite change, chills and fever. HENT: Positive for hearing loss. Eyes: Negative. Respiratory: Negative. Negative for shortness of breath. Cardiovascular: Negative.   Negative for ches No edema present. No thyroid mass and no thyromegaly present. Cardiovascular: Normal rate, regular rhythm, S1 normal, S2 normal, normal heart sounds and intact distal pulses. Exam reveals no gallop, no S3, no S4 and no friction rub. No murmur heard. for a Medicare Assessment. PLAN SUMMARY:        Diet assessment: good     PLAN:  The patient indicates understanding of these issues and agrees to the plan.   Problem List Items Addressed This Visit        Cardiovascular    Hyperlipidemia (Chronic)    O 07/05/2017 11:22 AM    TSHT4 1.13 04/11/2014 08:51 AM         Endocrine Medications          Levothyroxine Sodium 75 MCG Oral Tab                    Musculoskeletal    Osteoporosis    Overview     Dexa 3/2016 with L spine -2.8 T score, -1.8 for hip Diagnoses     Annual physical exam    -  Primary    Bilateral hearing loss, unspecified hearing loss type        Relevant Orders    ENT - INTERNAL    Encounter for annual health examination        Postmenopausal        Relevant Orders    XR DEXA BONE DENSI Ophthalmology Visit Annually: Diabetics, FHx Glaucoma, AA>50, > 65 Data entered on: 6/15/2015   Last Dilated Eye Exam 6/8/2015       Bone Density Screening      Dexascan Every two years Last Dexa Scan:   XR DEXA BONE DENSITOMETRY (CPT=77080) 03/28/ (ACE/ARB, digoxin diuretics, anticonvulsants.)    Potassium  Annually Potassium (mmol/L)   Date Value   04/02/2019 3.9     POTASSIUM (mmol/L)   Date Value   06/03/2015 4.4    No flowsheet data found.     Creatinine  Annually CREATININE (mg/dL)   Date Value

## 2019-04-10 NOTE — PATIENT INSTRUCTIONS
Mony Redd's SCREENING SCHEDULE   Tests on this list are recommended by your physician but may not be covered, or covered at this frequency, by your insurer. Please check with your insurance carrier before scheduling to verify coverage.    PREVENTATIV who are 73-68 years old and have smoked more than 100 cigarettes in their lifetime   • Anyone with a family history    Colorectal Cancer Screening  Covered up to Age 76     Colonoscopy Screen   Covered every 10 years- more often if abnormal Colonoscopy due Immunizations      Influenza  Covered Annually Orders placed or performed in visit on 12/03/14   • FLU VACC PRSV FREE INC ANTIG   • ADMIN INFLUENZA VIRUS VAC    Please get every year    Pneumococcal 13 (Prevnar)  Covered Once after 65 Orders placed or pe information from the [address] [place] [address] regarding Advance Directives.

## 2019-04-11 ENCOUNTER — APPOINTMENT (OUTPATIENT)
Dept: LAB | Facility: HOSPITAL | Age: 74
End: 2019-04-11
Attending: FAMILY MEDICINE
Payer: MEDICARE

## 2019-04-11 DIAGNOSIS — M81.0 AGE-RELATED OSTEOPOROSIS WITHOUT CURRENT PATHOLOGICAL FRACTURE: ICD-10-CM

## 2019-04-11 DIAGNOSIS — Z12.11 COLON CANCER SCREENING: ICD-10-CM

## 2019-04-11 DIAGNOSIS — I25.10 CORONARY ARTERY DISEASE INVOLVING NATIVE CORONARY ARTERY OF NATIVE HEART WITHOUT ANGINA PECTORIS: Chronic | ICD-10-CM

## 2019-04-11 DIAGNOSIS — I10 HYPERTENSION GOAL BP (BLOOD PRESSURE) < 140/90: Chronic | ICD-10-CM

## 2019-04-11 PROCEDURE — 82274 ASSAY TEST FOR BLOOD FECAL: CPT

## 2019-04-16 DIAGNOSIS — E78.00 PURE HYPERCHOLESTEROLEMIA: Chronic | ICD-10-CM

## 2019-04-16 RX ORDER — MONTELUKAST SODIUM 10 MG/1
TABLET ORAL
Qty: 90 TABLET | Refills: 1 | Status: SHIPPED | OUTPATIENT
Start: 2019-04-16 | End: 2019-12-17

## 2019-04-16 RX ORDER — ERGOCALCIFEROL 1.25 MG/1
CAPSULE ORAL
Qty: 3 CAPSULE | Refills: 3 | Status: SHIPPED | OUTPATIENT
Start: 2019-04-16 | End: 2020-05-04

## 2019-04-16 NOTE — TELEPHONE ENCOUNTER
Patient is calling because she is completely out of Montelukast Sodium 10 MG Oral Tab. LOV was on 4/10/19.

## 2019-04-17 RX ORDER — SIMVASTATIN 40 MG
TABLET ORAL
Qty: 90 TABLET | Refills: 1 | Status: SHIPPED | OUTPATIENT
Start: 2019-04-17 | End: 2019-10-17

## 2019-04-17 NOTE — TELEPHONE ENCOUNTER
Cholesterol Medication Protocol Passed4/16 8:26 PM  + ALT < 80   + ALT resulted within past year   + Lipid panel within past 12 months   + Appointment within past 12 or next 3 months     Last labs 4/2/19, LOV 4/10/19  Refilled Simvastatin per protocol

## 2019-05-07 DIAGNOSIS — I10 HYPERTENSION GOAL BP (BLOOD PRESSURE) < 140/90: Chronic | ICD-10-CM

## 2019-05-07 RX ORDER — LOSARTAN POTASSIUM 50 MG/1
TABLET ORAL
Qty: 90 TABLET | Refills: 1 | Status: SHIPPED | OUTPATIENT
Start: 2019-05-07 | End: 2019-11-07

## 2019-06-12 ENCOUNTER — HOSPITAL ENCOUNTER (OUTPATIENT)
Dept: BONE DENSITY | Age: 74
Discharge: HOME | End: 2019-06-12
Attending: FAMILY MEDICINE
Payer: MEDICARE

## 2019-06-12 DIAGNOSIS — Z78.0 POSTMENOPAUSAL: ICD-10-CM

## 2019-06-12 PROCEDURE — 77080 DXA BONE DENSITY AXIAL: CPT | Performed by: FAMILY MEDICINE

## 2019-06-21 DIAGNOSIS — L71.9 ROSACEA: ICD-10-CM

## 2019-06-21 DIAGNOSIS — E03.9 ACQUIRED HYPOTHYROIDISM: Chronic | ICD-10-CM

## 2019-06-22 RX ORDER — LEVOTHYROXINE SODIUM 0.07 MG/1
TABLET ORAL
Qty: 90 TABLET | Refills: 2 | Status: SHIPPED | OUTPATIENT
Start: 2019-06-22 | End: 2020-03-21

## 2019-07-03 ENCOUNTER — TELEPHONE (OUTPATIENT)
Dept: FAMILY MEDICINE CLINIC | Facility: CLINIC | Age: 74
End: 2019-07-03

## 2019-07-03 DIAGNOSIS — M81.0 AGE-RELATED OSTEOPOROSIS WITHOUT CURRENT PATHOLOGICAL FRACTURE: Primary | ICD-10-CM

## 2019-07-03 NOTE — TELEPHONE ENCOUNTER
Triage, please try to contact this patient and give her the instructions from Dr Jose Arteaga, Thank you

## 2019-07-03 NOTE — TELEPHONE ENCOUNTER
Result Notes for XR DEXA BONE DENSITOMETRY (CPT=77080)     Notes recorded by Peewee Mccabe CMA on 6/17/2019 at 10:44 AM CDT  Left message for patient to call back regarding test results.     ------    Notes recorded by Amrita Champagne MD on 6/12/2019 a

## 2019-07-05 RX ORDER — ALENDRONATE SODIUM 70 MG/1
70 TABLET ORAL WEEKLY
Qty: 12 TABLET | Refills: 3 | Status: SHIPPED | OUTPATIENT
Start: 2019-07-05 | End: 2020-01-15 | Stop reason: ALTCHOICE

## 2019-10-08 DIAGNOSIS — I25.10 CORONARY ARTERY DISEASE INVOLVING NATIVE CORONARY ARTERY OF NATIVE HEART WITHOUT ANGINA PECTORIS: Chronic | ICD-10-CM

## 2019-10-08 DIAGNOSIS — I10 HYPERTENSION GOAL BP (BLOOD PRESSURE) < 140/90: Chronic | ICD-10-CM

## 2019-10-08 RX ORDER — MONTELUKAST SODIUM 10 MG/1
TABLET ORAL
Qty: 90 TABLET | Refills: 1 | Status: SHIPPED | OUTPATIENT
Start: 2019-10-08 | End: 2020-04-04

## 2019-10-08 NOTE — TELEPHONE ENCOUNTER
LOV 4/2019 physical with Dr Ortiz Garcia, labs also completed then, no future appt schedule  Pt uses Singulair for COPD  Rx refilled

## 2019-10-08 NOTE — TELEPHONE ENCOUNTER
Montelukast Sodium 10 Mg Tab Teva          Will file in chart as: MONTELUKAST SODIUM 10 MG Oral Tab    The source prescription was reordered on 4/16/2019 by J Carlos Isabel RN.     Sig: TAKE ONE TABLET BY MOUTH EVERY EVENING    Asthma & COPD Medication Protoc

## 2019-10-15 ENCOUNTER — PATIENT MESSAGE (OUTPATIENT)
Dept: FAMILY MEDICINE CLINIC | Facility: CLINIC | Age: 74
End: 2019-10-15

## 2019-10-15 NOTE — TELEPHONE ENCOUNTER
[facility]  To: Ana Rosa Carrion MD  Sent: 10/15/2019 3:37 PM CDT  Subject: Other    I received my influenza injection 9/25/19 at [address] [place] [address]

## 2019-10-17 DIAGNOSIS — E78.00 PURE HYPERCHOLESTEROLEMIA: Chronic | ICD-10-CM

## 2019-10-17 RX ORDER — SIMVASTATIN 40 MG
TABLET ORAL
Qty: 90 TABLET | Refills: 0 | Status: SHIPPED | OUTPATIENT
Start: 2019-10-17 | End: 2020-01-17

## 2019-10-17 NOTE — TELEPHONE ENCOUNTER
Requested Prescriptions     Pending Prescriptions Disp Refills   • SIMVASTATIN 40 MG Oral Tab [Pharmacy Med Name: Simvastatin 40 Mg Tab Nort] 90 tablet 0     Sig: TAKE ONE TABLET BY MOUTH EVERY EVENING     LOV 4/10/2019     Patient was asked to follow-up i

## 2019-11-07 DIAGNOSIS — I10 HYPERTENSION GOAL BP (BLOOD PRESSURE) < 140/90: Chronic | ICD-10-CM

## 2019-11-07 RX ORDER — LOSARTAN POTASSIUM 50 MG/1
TABLET ORAL
Qty: 90 TABLET | Refills: 1 | Status: SHIPPED | OUTPATIENT
Start: 2019-11-07 | End: 2020-05-07

## 2019-11-07 NOTE — TELEPHONE ENCOUNTER
Requested Prescriptions     Pending Prescriptions Disp Refills   • LOSARTAN POTASSIUM 50 MG Oral Tab [Pharmacy Med Name: Losartan Potassium 50 Mg Tab [facility]] 90 tablet 0     Sig: TAKE ONE TABLET BY MOUTH DAILY     LOV 4/10/2019     Patient was asked to follo

## 2019-11-21 ENCOUNTER — PATIENT MESSAGE (OUTPATIENT)
Dept: FAMILY MEDICINE CLINIC | Facility: CLINIC | Age: 74
End: 2019-11-21

## 2019-11-21 NOTE — TELEPHONE ENCOUNTER
From: Chico Manriquez  To: Aida Bridges MD  Sent: 11/21/2019 9:54 AM CST  Subject: Prescription Question    How do we know the Alendronate Sodium prescription is helping my bones?   I ask this question because I'm concerned about the side effects I experienc

## 2019-12-17 ENCOUNTER — TELEPHONE (OUTPATIENT)
Dept: FAMILY MEDICINE CLINIC | Facility: CLINIC | Age: 74
End: 2019-12-17

## 2019-12-18 NOTE — TELEPHONE ENCOUNTER
Side effects of bone pain on fosamax, using since July, ok to stop, but we should have her follow up to discuss alternaties including prolia. Please call.  Thanks, Taryn Monzon MD

## 2019-12-19 ENCOUNTER — TELEPHONE (OUTPATIENT)
Dept: FAMILY MEDICINE CLINIC | Facility: CLINIC | Age: 74
End: 2019-12-19

## 2019-12-19 DIAGNOSIS — Z12.31 BREAST CANCER SCREENING BY MAMMOGRAM: Primary | ICD-10-CM

## 2019-12-19 NOTE — TELEPHONE ENCOUNTER
Mammogram ordered. Left message notifying patient. Patient advised to call office with any questions.

## 2020-01-02 ENCOUNTER — HOSPITAL ENCOUNTER (OUTPATIENT)
Dept: MAMMOGRAPHY | Age: 75
Discharge: HOME | End: 2020-01-02
Attending: FAMILY MEDICINE
Payer: MEDICARE

## 2020-01-02 DIAGNOSIS — Z12.31 BREAST CANCER SCREENING BY MAMMOGRAM: ICD-10-CM

## 2020-01-02 PROCEDURE — 77067 SCR MAMMO BI INCL CAD: CPT | Performed by: FAMILY MEDICINE

## 2020-01-02 PROCEDURE — 77063 BREAST TOMOSYNTHESIS BI: CPT | Performed by: FAMILY MEDICINE

## 2020-01-07 NOTE — ASSESSMENT & PLAN NOTE
Stable   CBC  (most recent labs)   Lab Results   Component Value Date/Time    WBC 5.7 04/02/2019 08:38 AM    HGB 13.9 04/02/2019 08:38 AM    HCT 42.3 04/02/2019 08:38 AM    .0 04/02/2019 08:38 AM Subjective:       Patient ID: Graciela Ratliff is a 32 y.o. female.    Chief Complaint: Nasal Congestion; Chest Congestion; Otalgia; and Cough    HPI  Graciela Ratliff is a 32 y.o. year old female without significant PMH who presents today complaining of cough, congestion and ear pain.    Patient recently return from a trip to [place].  She complains of nasal congestion, ear pain and cough that began about 10 days ago.  When she was on the plane traveling home her ears kept popping and she felt like she was under water.  She later developed severe left ear pain.  She went to urgent care on  upon returning to the [place].  She was diagnosed with acute serous otitis media bilaterally. She was started on Augmentin, which she has been compliant with.  She is also taking Claritin D twice daily and using warm compresses on her face.  She is feeling much better in terms of the ear pain.  However, she still has some popping and pressure in her ears.  She still feels like her hearing is slightly muffled.  Her cough has improved.  She is still coughing at night somewhat, but it is less productive.  She is still having some rhinorrhea and postnasal drip.  Denies fever.  She currently denies any GI symptoms.  She did have some diarrhea while in [place] that resolved and diarrhea on the 1st day of taking antibiotics.    OB/GYN History     LMP: 17  Sexually active: yes  Contraception: condoms     Health Maintenance  Pap smear: 1 year ago. Has Hx of HPV warts externally.   Mammogram: n/a  Colon Cancer Screening: n/a  DEXA: n/a  Hepatitis C screening: n/a  Flu vaccine: due  Tetanus vaccine:  PNA vaccine: n/a  Shingles vaccine: n/a    I personally reviewed Past Medical History, Past Surgical History, Social History, and Family History    Review of Systems   Constitutional: Negative for chills, fatigue and fever.   HENT: Positive for congestion, ear pain, hearing loss, postnasal drip, rhinorrhea and sinus  "pressure. Negative for sneezing, sore throat and trouble swallowing.    Eyes: Negative for visual disturbance.   Respiratory: Positive for cough. Negative for shortness of breath and wheezing.    Cardiovascular: Negative for chest pain.   Gastrointestinal: Negative for abdominal pain, constipation, diarrhea, nausea and vomiting.   Skin: Negative for rash.   Neurological: Negative for dizziness, syncope and headaches.   Psychiatric/Behavioral: Negative for dysphoric mood and sleep disturbance. The patient is not nervous/anxious.        Objective:      Vitals:    01/08/20 0940   BP: 130/66   Pulse: 83   SpO2: 99%   Weight: 76.2 kg (167 lb 15.9 oz)   Height: 5' 4" (1.626 m)     Physical Exam   Constitutional: She is oriented to person, place, and time. She appears well-developed and well-nourished. No distress.   HENT:   Head: Normocephalic and atraumatic.   Right Ear: Hearing, external ear and ear canal normal.   Left Ear: Hearing, external ear and ear canal normal.   Nose: Rhinorrhea present. Right sinus exhibits no maxillary sinus tenderness and no frontal sinus tenderness. Left sinus exhibits no maxillary sinus tenderness and no frontal sinus tenderness.   Mouth/Throat: Oropharynx is clear and moist and mucous membranes are normal.   Fluid present behind TMs bilaterally. No evidence of AOM.   Eyes: Conjunctivae and EOM are normal.   Neck: Normal range of motion.   Cardiovascular: Normal rate, regular rhythm and normal heart sounds.   No murmur heard.  Pulmonary/Chest: Effort normal and breath sounds normal. No respiratory distress.   Lymphadenopathy:     She has no cervical adenopathy.   Neurological: She is alert and oriented to person, place, and time.   Skin: Skin is warm and dry. No rash noted.   Psychiatric: She has a normal mood and affect. Her behavior is normal. Thought content normal.   Nursing note and vitals reviewed.      Assessment:       1. Non-recurrent acute serous otitis media of both ears    2. " Viral URI with cough        Plan:     Non-recurrent acute serous otitis media of both ears  Viral URI with cough  Symptoms are improving.  Advised patient to finish antibiotic course.  Recommended she also continue Claritin-D as needed.  She will add Mucinex to her regimen.  Offered prescription oral steroids, but patient would like to hold off for now as she does not feel her symptoms are that bad. Recommended hydration, humidifier/steamy shower, warm liquids, salt water gargles. RTC if worsening or not improving.    Recommended patient have flu vaccine when she is feeling better.

## 2020-01-16 ENCOUNTER — OFFICE VISIT (OUTPATIENT)
Dept: FAMILY MEDICINE CLINIC | Facility: CLINIC | Age: 75
End: 2020-01-16
Payer: MEDICARE

## 2020-01-16 VITALS
RESPIRATION RATE: 14 BRPM | BODY MASS INDEX: 19.46 KG/M2 | DIASTOLIC BLOOD PRESSURE: 78 MMHG | WEIGHT: 124 LBS | HEIGHT: 67 IN | TEMPERATURE: 98 F | HEART RATE: 64 BPM | SYSTOLIC BLOOD PRESSURE: 100 MMHG

## 2020-01-16 DIAGNOSIS — E78.00 PURE HYPERCHOLESTEROLEMIA: Chronic | ICD-10-CM

## 2020-01-16 DIAGNOSIS — M81.0 AGE-RELATED OSTEOPOROSIS WITHOUT CURRENT PATHOLOGICAL FRACTURE: Primary | ICD-10-CM

## 2020-01-16 DIAGNOSIS — I73.9 PVD (PERIPHERAL VASCULAR DISEASE) (HCC): ICD-10-CM

## 2020-01-16 DIAGNOSIS — E61.1 IRON DEFICIENCY: ICD-10-CM

## 2020-01-16 DIAGNOSIS — Z00.00 LABORATORY EXAMINATION ORDERED AS PART OF A ROUTINE GENERAL MEDICAL EXAMINATION: ICD-10-CM

## 2020-01-16 DIAGNOSIS — I70.0 THORACIC AORTA ATHEROSCLEROSIS (HCC): ICD-10-CM

## 2020-01-16 DIAGNOSIS — J44.9 COPD, MILD (HCC): ICD-10-CM

## 2020-01-16 PROBLEM — M47.816 ARTHRITIS OF FACET JOINT OF LUMBAR SPINE: Status: ACTIVE | Noted: 2018-04-09

## 2020-01-16 PROCEDURE — 99214 OFFICE O/P EST MOD 30 MIN: CPT | Performed by: FAMILY MEDICINE

## 2020-01-16 NOTE — PROGRESS NOTES
Patient presents with:  Medication Follow-Up: Pt would like to dicuss bone density medication       Subjective   HPI:   This is a 66year old female coming in for osteoporosis, hurts behind left ear and sometimes insomnia with medication.  Cough with it ass sounds are normal. There is no tenderness. Neurological: She is alert and oriented to person, place, and time. She has normal strength. Skin: Skin is warm and dry. No rash noted. Psychiatric: She has a normal mood and affect.  Her speech is normal and to improve, for [facility] ([address]) visit ([facility]).     Al Simeon MD, 1/16/2020, 12:20 PM

## 2020-01-16 NOTE — PATIENT INSTRUCTIONS
Forteo daily injection  Future Appointments   Date Time Provider Annmarie Gordon   7/15/2020 11:45 AM Marisa Castro MD [address]

## 2020-01-17 RX ORDER — SIMVASTATIN 40 MG
TABLET ORAL
Qty: 90 TABLET | Refills: 0 | Status: SHIPPED | OUTPATIENT
Start: 2020-01-17 | End: 2020-04-17

## 2020-01-17 NOTE — ASSESSMENT & PLAN NOTE
Long discussion on options for tx from IV to oral, forteo to evista to reclast, will try Prolia, attempting prior authorization

## 2020-01-20 ENCOUNTER — TELEPHONE (OUTPATIENT)
Dept: FAMILY MEDICINE CLINIC | Facility: CLINIC | Age: 75
End: 2020-01-20

## 2020-01-20 ENCOUNTER — APPOINTMENT (OUTPATIENT)
Dept: LAB | Age: 75
End: 2020-01-20
Attending: FAMILY MEDICINE
Payer: MEDICARE

## 2020-01-20 DIAGNOSIS — M81.0 AGE-RELATED OSTEOPOROSIS WITHOUT CURRENT PATHOLOGICAL FRACTURE: ICD-10-CM

## 2020-01-20 DIAGNOSIS — Z00.00 LABORATORY EXAMINATION ORDERED AS PART OF A ROUTINE GENERAL MEDICAL EXAMINATION: ICD-10-CM

## 2020-01-20 DIAGNOSIS — E61.1 IRON DEFICIENCY: ICD-10-CM

## 2020-01-20 LAB
ALBUMIN SERPL-MCNC: 4.2 G/DL (ref 3.4–5)
ALBUMIN/GLOB SERPL: 1.2 {RATIO} (ref 1–2)
ALP LIVER SERPL-CCNC: 54 U/L (ref 55–142)
ALT SERPL-CCNC: 29 U/L (ref 13–56)
ANION GAP SERPL CALC-SCNC: 7 MMOL/L (ref 0–18)
AST SERPL-CCNC: 19 U/L (ref 15–37)
BILIRUB SERPL-MCNC: 0.8 MG/DL (ref 0.1–2)
BUN BLD-MCNC: 17 MG/DL (ref 7–18)
BUN/CREAT SERPL: 20.2 (ref 10–20)
CALCIUM BLD-MCNC: 9.9 MG/DL (ref 8.5–10.1)
CHLORIDE SERPL-SCNC: 109 MMOL/L (ref 98–112)
CHOLEST SMN-MCNC: 162 MG/DL (ref ?–200)
CO2 SERPL-SCNC: 27 MMOL/L (ref 21–32)
CREAT BLD-MCNC: 0.84 MG/DL (ref 0.55–1.02)
DEPRECATED HBV CORE AB SER IA-ACNC: 76.3 NG/ML (ref 18–340)
DEPRECATED RDW RBC AUTO: 45.1 FL (ref 35.1–46.3)
ERYTHROCYTE [DISTWIDTH] IN BLOOD BY AUTOMATED COUNT: 12.6 % (ref 11–15)
GLOBULIN PLAS-MCNC: 3.5 G/DL (ref 2.8–4.4)
GLUCOSE BLD-MCNC: 87 MG/DL (ref 70–99)
HAV IGM SER QL: 2.3 MG/DL (ref 1.6–2.6)
HCT VFR BLD AUTO: 44 % (ref 35–48)
HDLC SERPL-MCNC: 96 MG/DL (ref 40–59)
HGB BLD-MCNC: 14.6 G/DL (ref 12–16)
IRON SATURATION: 23 % (ref 15–50)
IRON SERPL-MCNC: 86 UG/DL (ref 50–170)
LDLC SERPL CALC-MCNC: 56 MG/DL (ref ?–100)
M PROTEIN MFR SERPL ELPH: 7.7 G/DL (ref 6.4–8.2)
MCH RBC QN AUTO: 32.2 PG (ref 26–34)
MCHC RBC AUTO-ENTMCNC: 33.2 G/DL (ref 31–37)
MCV RBC AUTO: 96.9 FL (ref 80–100)
NONHDLC SERPL-MCNC: 66 MG/DL (ref ?–130)
OSMOLALITY SERPL CALC.SUM OF ELEC: 297 MOSM/KG (ref 275–295)
PATIENT FASTING Y/N/NP: YES
PATIENT FASTING Y/N/NP: YES
PHOSPHATE SERPL-MCNC: 3.2 MG/DL (ref 2.5–4.9)
PLATELET # BLD AUTO: 227 10(3)UL (ref 150–450)
POTASSIUM SERPL-SCNC: 3.9 MMOL/L (ref 3.5–5.1)
RBC # BLD AUTO: 4.54 X10(6)UL (ref 3.8–5.3)
SODIUM SERPL-SCNC: 143 MMOL/L (ref 136–145)
TOTAL IRON BINDING CAPACITY: 373 UG/DL (ref 240–450)
TRANSFERRIN SERPL-MCNC: 250 MG/DL (ref 200–360)
TRIGL SERPL-MCNC: 51 MG/DL (ref 30–149)
TSI SER-ACNC: 1.01 MIU/ML (ref 0.36–3.74)
VIT D+METAB SERPL-MCNC: 44.7 NG/ML (ref 30–100)
VLDLC SERPL CALC-MCNC: 10 MG/DL (ref 0–30)
WBC # BLD AUTO: 5.9 X10(3) UL (ref 4–11)

## 2020-01-20 PROCEDURE — 83550 IRON BINDING TEST: CPT

## 2020-01-20 PROCEDURE — 80061 LIPID PANEL: CPT

## 2020-01-20 PROCEDURE — 82728 ASSAY OF FERRITIN: CPT

## 2020-01-20 PROCEDURE — 36415 COLL VENOUS BLD VENIPUNCTURE: CPT

## 2020-01-20 PROCEDURE — 85027 COMPLETE CBC AUTOMATED: CPT

## 2020-01-20 PROCEDURE — 84443 ASSAY THYROID STIM HORMONE: CPT

## 2020-01-20 PROCEDURE — 82306 VITAMIN D 25 HYDROXY: CPT

## 2020-01-20 PROCEDURE — 80053 COMPREHEN METABOLIC PANEL: CPT

## 2020-01-20 PROCEDURE — 84100 ASSAY OF PHOSPHORUS: CPT

## 2020-01-20 PROCEDURE — 83735 ASSAY OF MAGNESIUM: CPT

## 2020-01-20 PROCEDURE — 83540 ASSAY OF IRON: CPT

## 2020-01-20 NOTE — TELEPHONE ENCOUNTER
Per Dr Subhash Reyes, this patient would like to receive Prolia after experiencing \"bone pain\" with fosamax  Msg forward to [facility] for PA

## 2020-02-03 ENCOUNTER — PATIENT MESSAGE (OUTPATIENT)
Dept: FAMILY MEDICINE CLINIC | Facility: CLINIC | Age: 75
End: 2020-02-03

## 2020-02-03 NOTE — TELEPHONE ENCOUNTER
From: Maxine Cash  To: Lise Early MD  Sent: 2/3/2020 9:51 AM CST  Subject: Visit Follow-up Question    At my last visit we discussed treatments for osteoporosis and decided Prolia injections would be the best. The blood tests have been completed and I

## 2020-02-03 NOTE — TELEPHONE ENCOUNTER
Please see [employer] message from patient  She want to start her Prolia,   Please conctact her with undate on insurance coverage, Thank you

## 2020-02-06 NOTE — TELEPHONE ENCOUNTER
Called pt with Prolia information received from  [employer]. With her insurance of Medicare Advantage, pt would be responsible for 20% of cost of Prolia which would be $276. Pt agreed to the cost and scheduled appt  2/21/20.   Pt has had required blood

## 2020-02-21 ENCOUNTER — OFFICE VISIT (OUTPATIENT)
Dept: FAMILY MEDICINE CLINIC | Facility: CLINIC | Age: 75
End: 2020-02-21
Payer: MEDICARE

## 2020-02-21 VITALS
HEIGHT: 67 IN | RESPIRATION RATE: 14 BRPM | BODY MASS INDEX: 19.34 KG/M2 | TEMPERATURE: 98 F | DIASTOLIC BLOOD PRESSURE: 70 MMHG | HEART RATE: 78 BPM | WEIGHT: 123.19 LBS | SYSTOLIC BLOOD PRESSURE: 100 MMHG

## 2020-02-21 DIAGNOSIS — M25.552 PAIN OF LEFT HIP JOINT: ICD-10-CM

## 2020-02-21 DIAGNOSIS — M81.0 AGE-RELATED OSTEOPOROSIS WITHOUT CURRENT PATHOLOGICAL FRACTURE: Primary | ICD-10-CM

## 2020-02-21 PROCEDURE — 99213 OFFICE O/P EST LOW 20 MIN: CPT | Performed by: NURSE PRACTITIONER

## 2020-02-21 PROCEDURE — 96372 THER/PROPH/DIAG INJ SC/IM: CPT | Performed by: NURSE PRACTITIONER

## 2020-02-21 NOTE — PROGRESS NOTES
Patient presents with:  Imm/Inj: prolia injection       HPI:  Presents for management of osteoporosis and initiation of Prolia. Recent labs with normal vit d, calcium and renal function.  Mg and phos also normal.  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 No murmur. Pulmonary/Chest: No respiratory distress. Effort normal. Breath sounds clear bilaterally. No wheezes, rhonchi or rales  MS: left hip mildly TTP over greater trochanter w/o erythema, edema, masses or obvious deformity.  ROM left hip WNL

## 2020-02-26 ENCOUNTER — TELEPHONE (OUTPATIENT)
Dept: FAMILY MEDICINE CLINIC | Facility: CLINIC | Age: 75
End: 2020-02-26

## 2020-02-26 DIAGNOSIS — I25.10 CORONARY ARTERY DISEASE INVOLVING NATIVE CORONARY ARTERY OF NATIVE HEART WITHOUT ANGINA PECTORIS: Primary | Chronic | ICD-10-CM

## 2020-02-26 DIAGNOSIS — Z00.00 LABORATORY EXAMINATION ORDERED AS PART OF A ROUTINE GENERAL MEDICAL EXAMINATION: ICD-10-CM

## 2020-02-26 DIAGNOSIS — I73.9 PVD (PERIPHERAL VASCULAR DISEASE) (HCC): ICD-10-CM

## 2020-02-26 DIAGNOSIS — J44.9 COPD, MILD (HCC): ICD-10-CM

## 2020-02-26 DIAGNOSIS — Z13.0 SCREENING FOR IRON DEFICIENCY ANEMIA: ICD-10-CM

## 2020-02-26 DIAGNOSIS — D50.0 IRON DEFICIENCY ANEMIA DUE TO CHRONIC BLOOD LOSS: Chronic | ICD-10-CM

## 2020-02-26 DIAGNOSIS — E03.9 ACQUIRED HYPOTHYROIDISM: Chronic | ICD-10-CM

## 2020-02-26 NOTE — TELEPHONE ENCOUNTER
Please enter lab orders for the patient's upcoming physical appointment. Physical scheduled:    Your appointments     Date & Time Appointment Department [facility])    Apr 24, 2020 10:00 AM CDT MA Supervisit with Yoli Vasquez MD [facility] Judi Mcdonald

## 2020-03-16 NOTE — TELEPHONE ENCOUNTER
Diagnoses and all orders for this visit:    Coronary artery disease involving native coronary artery of native heart without angina pectoris  -     COMP METABOLIC PANEL (14); Future  -     LIPID PANEL;  Future    COPD, mild (HCC)  -     CBC WITH DIFFERENTIA

## 2020-03-21 DIAGNOSIS — E03.9 ACQUIRED HYPOTHYROIDISM: Chronic | ICD-10-CM

## 2020-03-21 RX ORDER — LEVOTHYROXINE SODIUM 0.07 MG/1
TABLET ORAL
Qty: 90 TABLET | Refills: 3 | Status: SHIPPED | OUTPATIENT
Start: 2020-03-21 | End: 2021-02-22

## 2020-04-04 DIAGNOSIS — I10 HYPERTENSION GOAL BP (BLOOD PRESSURE) < 140/90: Chronic | ICD-10-CM

## 2020-04-04 DIAGNOSIS — I25.10 CORONARY ARTERY DISEASE INVOLVING NATIVE CORONARY ARTERY OF NATIVE HEART WITHOUT ANGINA PECTORIS: Chronic | ICD-10-CM

## 2020-04-04 RX ORDER — MONTELUKAST SODIUM 10 MG/1
TABLET ORAL
Qty: 90 TABLET | Refills: 0 | Status: SHIPPED | OUTPATIENT
Start: 2020-04-04 | End: 2020-07-10

## 2020-04-16 DIAGNOSIS — E78.00 PURE HYPERCHOLESTEROLEMIA: Chronic | ICD-10-CM

## 2020-04-17 DIAGNOSIS — E78.00 PURE HYPERCHOLESTEROLEMIA: Chronic | ICD-10-CM

## 2020-04-17 RX ORDER — SIMVASTATIN 40 MG
40 TABLET ORAL EVERY EVENING
Qty: 90 TABLET | Refills: 0 | Status: SHIPPED | OUTPATIENT
Start: 2020-04-17 | End: 2020-04-19

## 2020-04-17 NOTE — TELEPHONE ENCOUNTER
Pt was to have 4/24 visit, please arrange Follow up with me video visit for next week.  Thanks and stay well, Daniela Roque MD     Script OK for 1 refill

## 2020-04-19 RX ORDER — SIMVASTATIN 40 MG
TABLET ORAL
Qty: 90 TABLET | Refills: 1 | Status: SHIPPED | OUTPATIENT
Start: 2020-04-19 | End: 2021-01-18

## 2020-05-04 DIAGNOSIS — M81.0 AGE-RELATED OSTEOPOROSIS WITHOUT CURRENT PATHOLOGICAL FRACTURE: ICD-10-CM

## 2020-05-04 RX ORDER — ERGOCALCIFEROL 1.25 MG/1
CAPSULE ORAL
Qty: 3 CAPSULE | Refills: 4 | Status: SHIPPED | OUTPATIENT
Start: 2020-05-04 | End: 2021-09-16

## 2020-05-04 NOTE — TELEPHONE ENCOUNTER
Refill request for:    Requested Prescriptions     Pending Prescriptions Disp Refills   • ERGOCALCIFEROL 1.25 MG (73078 UT) Oral Cap [Pharmacy Med Name: Vitamin D 50,000 Unit Cap Stri] 3 capsule 0     Sig: TAKE 1 CAPSULE BY MOUTH MONTHLY        Last Prescr

## 2020-05-07 DIAGNOSIS — I10 HYPERTENSION GOAL BP (BLOOD PRESSURE) < 140/90: Chronic | ICD-10-CM

## 2020-05-07 RX ORDER — LOSARTAN POTASSIUM 50 MG/1
TABLET ORAL
Qty: 90 TABLET | Refills: 0 | Status: SHIPPED | OUTPATIENT
Start: 2020-05-07 | End: 2020-08-03

## 2020-05-07 NOTE — TELEPHONE ENCOUNTER
Requested Prescriptions     Pending Prescriptions Disp Refills   • LOSARTAN POTASSIUM 50 MG Oral Tab [Pharmacy Med Name: Losartan Potassium 50 Mg Tab [facility]] 90 tablet 0     Sig: TAKE ONE TABLET BY MOUTH DAILY     LOV 2/21/2020         Appointment scheduled:

## 2020-06-26 ENCOUNTER — TELEPHONE (OUTPATIENT)
Dept: FAMILY MEDICINE CLINIC | Facility: CLINIC | Age: 75
End: 2020-06-26

## 2020-06-26 NOTE — TELEPHONE ENCOUNTER
Initiated PA for Prolia by [employer] documenting pt information, diagnosis, continuation of therapy and previous DexaScan. Case # [id number]. Await response within 72 hours.

## 2020-07-09 DIAGNOSIS — I10 HYPERTENSION GOAL BP (BLOOD PRESSURE) < 140/90: Chronic | ICD-10-CM

## 2020-07-09 DIAGNOSIS — I25.10 CORONARY ARTERY DISEASE INVOLVING NATIVE CORONARY ARTERY OF NATIVE HEART WITHOUT ANGINA PECTORIS: Chronic | ICD-10-CM

## 2020-07-09 NOTE — TELEPHONE ENCOUNTER
Received approval for Prolia from [facility] [employer] approved for dates of 2/21/20 until 12/31/20. Called pt re approval and informed she should have required blood tests 10-14 days prior to 8/24//20 scheduled Prolia injection with Lizzy Camacho NP.   Pt will reschedule he

## 2020-07-10 DIAGNOSIS — I25.10 CORONARY ARTERY DISEASE INVOLVING NATIVE CORONARY ARTERY OF NATIVE HEART WITHOUT ANGINA PECTORIS: Chronic | ICD-10-CM

## 2020-07-10 DIAGNOSIS — I10 HYPERTENSION GOAL BP (BLOOD PRESSURE) < 140/90: Chronic | ICD-10-CM

## 2020-07-10 RX ORDER — MONTELUKAST SODIUM 10 MG/1
TABLET ORAL
Qty: 90 TABLET | Refills: 0 | Status: SHIPPED | OUTPATIENT
Start: 2020-07-10 | End: 2020-07-12

## 2020-07-12 DIAGNOSIS — I25.10 CORONARY ARTERY DISEASE INVOLVING NATIVE CORONARY ARTERY OF NATIVE HEART WITHOUT ANGINA PECTORIS: Chronic | ICD-10-CM

## 2020-07-12 DIAGNOSIS — I10 HYPERTENSION GOAL BP (BLOOD PRESSURE) < 140/90: Chronic | ICD-10-CM

## 2020-07-12 RX ORDER — MONTELUKAST SODIUM 10 MG/1
TABLET ORAL
Qty: 90 TABLET | Refills: 0 | Status: SHIPPED | OUTPATIENT
Start: 2020-07-12 | End: 2021-01-11

## 2020-07-14 ENCOUNTER — TELEPHONE (OUTPATIENT)
Dept: FAMILY MEDICINE CLINIC | Facility: CLINIC | Age: 75
End: 2020-07-14

## 2020-07-14 RX ORDER — MONTELUKAST SODIUM 10 MG/1
TABLET ORAL
Qty: 90 TABLET | Refills: 0 | OUTPATIENT
Start: 2020-07-14

## 2020-07-18 NOTE — ASSESSMENT & PLAN NOTE
Stable, Continue present management.     Thyroid  (most recent labs)   Lab Results   Component Value Date/Time    TSH 1.010 01/20/2020 09:18 AM    T4F 1.4 07/05/2017 11:22 AM    TSHT4 1.13 04/11/2014 08:51 AM         Endocrine Medications          LEVOTHYRO

## 2020-07-20 ENCOUNTER — OFFICE VISIT (OUTPATIENT)
Dept: FAMILY MEDICINE CLINIC | Facility: CLINIC | Age: 75
End: 2020-07-20
Payer: MEDICARE

## 2020-07-20 VITALS
HEART RATE: 62 BPM | BODY MASS INDEX: 18.05 KG/M2 | SYSTOLIC BLOOD PRESSURE: 116 MMHG | TEMPERATURE: 98 F | HEIGHT: 67 IN | RESPIRATION RATE: 18 BRPM | WEIGHT: 115 LBS | DIASTOLIC BLOOD PRESSURE: 65 MMHG

## 2020-07-20 DIAGNOSIS — I10 ESSENTIAL HYPERTENSION: ICD-10-CM

## 2020-07-20 DIAGNOSIS — E78.2 MIXED HYPERLIPIDEMIA: ICD-10-CM

## 2020-07-20 DIAGNOSIS — I25.10 CORONARY ARTERY DISEASE INVOLVING NATIVE CORONARY ARTERY OF NATIVE HEART WITHOUT ANGINA PECTORIS: Chronic | ICD-10-CM

## 2020-07-20 DIAGNOSIS — L71.9 ROSACEA: ICD-10-CM

## 2020-07-20 DIAGNOSIS — I70.0 THORACIC AORTA ATHEROSCLEROSIS (HCC): ICD-10-CM

## 2020-07-20 DIAGNOSIS — Z79.899 MEDICATION MANAGEMENT: ICD-10-CM

## 2020-07-20 DIAGNOSIS — M47.816 ARTHRITIS OF FACET JOINT OF LUMBAR SPINE: ICD-10-CM

## 2020-07-20 DIAGNOSIS — M81.0 AGE-RELATED OSTEOPOROSIS WITHOUT CURRENT PATHOLOGICAL FRACTURE: ICD-10-CM

## 2020-07-20 DIAGNOSIS — E03.9 ACQUIRED HYPOTHYROIDISM: ICD-10-CM

## 2020-07-20 DIAGNOSIS — D50.0 IRON DEFICIENCY ANEMIA DUE TO CHRONIC BLOOD LOSS: Chronic | ICD-10-CM

## 2020-07-20 DIAGNOSIS — H25.9 AGE-RELATED CATARACT OF BOTH EYES, UNSPECIFIED AGE-RELATED CATARACT TYPE: Chronic | ICD-10-CM

## 2020-07-20 DIAGNOSIS — Z00.00 ANNUAL PHYSICAL EXAM: Primary | ICD-10-CM

## 2020-07-20 DIAGNOSIS — J44.9 COPD, MILD (HCC): ICD-10-CM

## 2020-07-20 DIAGNOSIS — Z00.00 ENCOUNTER FOR ANNUAL HEALTH EXAMINATION: ICD-10-CM

## 2020-07-20 DIAGNOSIS — H35.033 HYPERTENSIVE RETINOPATHY OF BOTH EYES: ICD-10-CM

## 2020-07-20 DIAGNOSIS — I73.9 PVD (PERIPHERAL VASCULAR DISEASE) (HCC): ICD-10-CM

## 2020-07-20 DIAGNOSIS — Z86.718 HISTORY OF VENOUS THROMBOSIS: ICD-10-CM

## 2020-07-20 PROCEDURE — 3074F SYST BP LT 130 MM HG: CPT | Performed by: FAMILY MEDICINE

## 2020-07-20 PROCEDURE — 99397 PER PM REEVAL EST PAT 65+ YR: CPT | Performed by: FAMILY MEDICINE

## 2020-07-20 PROCEDURE — G0439 PPPS, SUBSEQ VISIT: HCPCS | Performed by: FAMILY MEDICINE

## 2020-07-20 PROCEDURE — 96160 PT-FOCUSED HLTH RISK ASSMT: CPT | Performed by: FAMILY MEDICINE

## 2020-07-20 PROCEDURE — 3078F DIAST BP <80 MM HG: CPT | Performed by: FAMILY MEDICINE

## 2020-07-20 PROCEDURE — 3008F BODY MASS INDEX DOCD: CPT | Performed by: FAMILY MEDICINE

## 2020-07-20 NOTE — PATIENT INSTRUCTIONS
Chele Redd's SCREENING SCHEDULE   Tests on this list are recommended by your physician but may not be covered, or covered at this frequency, by your insurer. Please check with your insurance carrier before scheduling to verify coverage.    PREVENTATIV who are 73-68 years old and have smoked more than 100 cigarettes in their lifetime   • Anyone with a family history    Colorectal Cancer Screening  Covered up to Age 76     Colonoscopy Screen   Covered every 10 years- more often if abnormal There are no pr Annually to at least age 76, and as needed after 76 There are no preventive care reminders to display for this patient.  Please get this Mammogram regularly   Immunizations      Influenza  Covered Annually Orders placed or performed in visit on 12/03/14   • anyone to review and print using their home computer and printer. (the forms are also available in [address])  [email or web address]  This link also has information from the [address] [place] [address] regarding Advance Directives.

## 2020-07-20 NOTE — PROGRESS NOTES
HPI:   Ishmael Mcconnell is a 66year old female who presents for a MA (Medicare Advantage) [facility] (Once per calendar year). Doing well overall, no new issues. Finger cramping regulalry, especially 4th and fifth, but can be entire hand .   Gradually takes aspirin and has it on her medication list.   CAGE Alcohol screening   Sanjay Lal was screened for Alcohol abuse and had a score of 0 so is at low risk.     Patient Care Team: Patient Care Team:  Janet Berrios MD as PCP - Genie Pace MD [nitrofurantoin monohydrate macrocrystals]; nexium; serevent [salmeterol xinafoate]; symbicort; and triamterene. CURRENT MEDICATIONS:   MONTELUKAST SODIUM 10 MG Oral Tab, take 1 tablet by mouth every evening.   LOSARTAN POTASSIUM 50 MG Oral Tab, TAKE ONE (crz=48040/86620) (Right, 1993). Her family history includes Breast Cancer (age of onset: 52) in her sister; Diabetes in her maternal grandmother, mother, and sister;  Heart Disease in her maternal grandmother and mother; Hyperlipidemia in her daughter; well-nourished. HENT:   Head: Normocephalic and atraumatic.    Right Ear: Tympanic membrane, external ear and ear canal normal.   Left Ear: Tympanic membrane, external ear and ear canal normal.   Nose: Nose normal.   Mouth/Throat: Uvula is midline, oropha Depo-Medrol 40mg Inj 10/30/2013   • FLU VACC High Dose 65 YRS & Older PRSV Free (52379) 12/03/2014, 10/11/2018, 09/14/2019   • Fluzone Vaccine Medicare () 10/09/2016, 11/03/2017, 10/10/2018   • Influenza 11/03/2013   • Pneumococcal (Prevnar 13) 05/28/ meds             Respiratory    COPD, mild ([facility]    Overview     Seeing pulmonology and occasional flar ups, albuterol helps.  Dr Yusuf Davis continue present management             Endocrine    Acquired h in 31 weeks (on 2/22/2021) for [facility] ([address]) visit ([facility]).      Romina Gillespie MD, 7/20/2020   Future Appointments   Date Time Provider Annmarie Gordon   7/21/2020  3:20 PM Oval Gilford, MD [facility]  [facility]   8/13/2020 10:30 AM R found.    Glaucoma Screening      Ophthalmology Visit Annually: Diabetics, FHx Glaucoma, AA>50, > 65 Data entered on: 6/15/2015   Last Dilated Eye Exam 6/8/2015       Bone Density Screening      Dexascan Every two years Last Dexa Scan:   XR DEXA PRETTY of Persistent     Medications (ACE/ARB, digoxin diuretics, anticonvulsants.)    Potassium  Annually Potassium (mmol/L)   Date Value   01/20/2020 3.9     POTASSIUM (mmol/L)   Date Value   06/03/2015 4.4    No flowsheet data found.     Creatinine  Annually CR

## 2020-07-21 ENCOUNTER — TELEPHONE (OUTPATIENT)
Dept: FAMILY MEDICINE CLINIC | Facility: CLINIC | Age: 75
End: 2020-07-21

## 2020-07-21 NOTE — TELEPHONE ENCOUNTER
Received [employer] approval with valid dates 2/21/20 until 12/31/20 Authorzation # [id number]  . I routed this to you on 7/09/20   Patient has been instructed about required blood tests.     Routed to ANGEL Verma RN

## 2020-07-21 NOTE — TELEPHONE ENCOUNTER
Prior Auth on Prolia   Due: In 1 month Received: Today   Message Contents   Eliceo Collins RN              Pt has upcoming appt for Prolia on 8/21/2020 and need PA completed through [address]

## 2020-08-03 DIAGNOSIS — I10 HYPERTENSION GOAL BP (BLOOD PRESSURE) < 140/90: Chronic | ICD-10-CM

## 2020-08-03 RX ORDER — LOSARTAN POTASSIUM 50 MG/1
TABLET ORAL
Qty: 90 TABLET | Refills: 0 | Status: SHIPPED | OUTPATIENT
Start: 2020-08-03 | End: 2020-11-03

## 2020-08-03 NOTE — TELEPHONE ENCOUNTER
Requested Prescriptions     Pending Prescriptions Disp Refills   • LOSARTAN POTASSIUM 50 MG Oral Tab [Pharmacy Med Name: Losartan Potassium 50 Mg Tab [facility]] 90 tablet 0     Sig: TAKE ONE TABLET BY MOUTH DAILY     LOV 7/20/2020         Appointment scheduled:

## 2020-08-13 ENCOUNTER — LAB ENCOUNTER (OUTPATIENT)
Dept: LAB | Age: 75
End: 2020-08-13
Attending: FAMILY MEDICINE
Payer: MEDICARE

## 2020-08-13 DIAGNOSIS — Z00.00 LABORATORY EXAMINATION ORDERED AS PART OF A ROUTINE GENERAL MEDICAL EXAMINATION: ICD-10-CM

## 2020-08-13 DIAGNOSIS — I73.9 PVD (PERIPHERAL VASCULAR DISEASE) (HCC): ICD-10-CM

## 2020-08-13 DIAGNOSIS — M81.0 AGE-RELATED OSTEOPOROSIS WITHOUT CURRENT PATHOLOGICAL FRACTURE: ICD-10-CM

## 2020-08-13 DIAGNOSIS — I25.10 CORONARY ARTERY DISEASE INVOLVING NATIVE CORONARY ARTERY OF NATIVE HEART WITHOUT ANGINA PECTORIS: Chronic | ICD-10-CM

## 2020-08-13 DIAGNOSIS — J44.9 COPD, MILD (HCC): ICD-10-CM

## 2020-08-13 DIAGNOSIS — D50.0 IRON DEFICIENCY ANEMIA DUE TO CHRONIC BLOOD LOSS: Chronic | ICD-10-CM

## 2020-08-13 DIAGNOSIS — E03.9 ACQUIRED HYPOTHYROIDISM: Chronic | ICD-10-CM

## 2020-08-13 LAB
ALBUMIN SERPL-MCNC: 4.3 G/DL (ref 3.4–5)
ALBUMIN/GLOB SERPL: 1.7 {RATIO} (ref 1–2)
ALP LIVER SERPL-CCNC: 52 U/L (ref 55–142)
ALT SERPL-CCNC: 26 U/L (ref 13–56)
ANION GAP SERPL CALC-SCNC: 4 MMOL/L (ref 0–18)
AST SERPL-CCNC: 25 U/L (ref 15–37)
BASOPHILS # BLD AUTO: 0.06 X10(3) UL (ref 0–0.2)
BASOPHILS NFR BLD AUTO: 1.1 %
BILIRUB SERPL-MCNC: 0.8 MG/DL (ref 0.1–2)
BUN BLD-MCNC: 20 MG/DL (ref 7–18)
BUN/CREAT SERPL: 24.1 (ref 10–20)
CALCIUM BLD-MCNC: 10 MG/DL (ref 8.5–10.1)
CHLORIDE SERPL-SCNC: 108 MMOL/L (ref 98–112)
CHOLEST SMN-MCNC: 171 MG/DL (ref ?–200)
CO2 SERPL-SCNC: 27 MMOL/L (ref 21–32)
CREAT BLD-MCNC: 0.83 MG/DL (ref 0.55–1.02)
DEPRECATED HBV CORE AB SER IA-ACNC: 65.4 NG/ML (ref 18–340)
DEPRECATED RDW RBC AUTO: 46.1 FL (ref 35.1–46.3)
EOSINOPHIL # BLD AUTO: 0.07 X10(3) UL (ref 0–0.7)
EOSINOPHIL NFR BLD AUTO: 1.3 %
ERYTHROCYTE [DISTWIDTH] IN BLOOD BY AUTOMATED COUNT: 12.9 % (ref 11–15)
GLOBULIN PLAS-MCNC: 2.6 G/DL (ref 2.8–4.4)
GLUCOSE BLD-MCNC: 97 MG/DL (ref 70–99)
HCT VFR BLD AUTO: 42.3 % (ref 35–48)
HDLC SERPL-MCNC: 99 MG/DL (ref 40–59)
HGB BLD-MCNC: 13.9 G/DL (ref 12–16)
IMM GRANULOCYTES # BLD AUTO: 0.02 X10(3) UL (ref 0–1)
IMM GRANULOCYTES NFR BLD: 0.4 %
IRON SATURATION: 18 % (ref 15–50)
IRON SERPL-MCNC: 72 UG/DL (ref 50–170)
LDLC SERPL CALC-MCNC: 63 MG/DL (ref ?–100)
LYMPHOCYTES # BLD AUTO: 1.3 X10(3) UL (ref 1–4)
LYMPHOCYTES NFR BLD AUTO: 24.1 %
M PROTEIN MFR SERPL ELPH: 6.9 G/DL (ref 6.4–8.2)
MCH RBC QN AUTO: 32.2 PG (ref 26–34)
MCHC RBC AUTO-ENTMCNC: 32.9 G/DL (ref 31–37)
MCV RBC AUTO: 97.9 FL (ref 80–100)
MONOCYTES # BLD AUTO: 0.64 X10(3) UL (ref 0.1–1)
MONOCYTES NFR BLD AUTO: 11.9 %
NEUTROPHILS # BLD AUTO: 3.31 X10 (3) UL (ref 1.5–7.7)
NEUTROPHILS # BLD AUTO: 3.31 X10(3) UL (ref 1.5–7.7)
NEUTROPHILS NFR BLD AUTO: 61.2 %
NONHDLC SERPL-MCNC: 72 MG/DL (ref ?–130)
OSMOLALITY SERPL CALC.SUM OF ELEC: 291 MOSM/KG (ref 275–295)
PATIENT FASTING Y/N/NP: YES
PATIENT FASTING Y/N/NP: YES
PHOSPHATE SERPL-MCNC: 3.4 MG/DL (ref 2.5–4.9)
PLATELET # BLD AUTO: 240 10(3)UL (ref 150–450)
POTASSIUM SERPL-SCNC: 5.4 MMOL/L (ref 3.5–5.1)
RBC # BLD AUTO: 4.32 X10(6)UL (ref 3.8–5.3)
SODIUM SERPL-SCNC: 139 MMOL/L (ref 136–145)
TOTAL IRON BINDING CAPACITY: 407 UG/DL (ref 240–450)
TRANSFERRIN SERPL-MCNC: 273 MG/DL (ref 200–360)
TRIGL SERPL-MCNC: 44 MG/DL (ref 30–149)
TSI SER-ACNC: 0.7 MIU/ML (ref 0.36–3.74)
VIT D+METAB SERPL-MCNC: 48 NG/ML (ref 30–100)
VLDLC SERPL CALC-MCNC: 9 MG/DL (ref 0–30)
WBC # BLD AUTO: 5.4 X10(3) UL (ref 4–11)

## 2020-08-13 PROCEDURE — 80061 LIPID PANEL: CPT

## 2020-08-13 PROCEDURE — 84443 ASSAY THYROID STIM HORMONE: CPT

## 2020-08-13 PROCEDURE — 83550 IRON BINDING TEST: CPT

## 2020-08-13 PROCEDURE — 82728 ASSAY OF FERRITIN: CPT

## 2020-08-13 PROCEDURE — 83540 ASSAY OF IRON: CPT

## 2020-08-13 PROCEDURE — 85025 COMPLETE CBC W/AUTO DIFF WBC: CPT

## 2020-08-13 PROCEDURE — 82306 VITAMIN D 25 HYDROXY: CPT

## 2020-08-13 PROCEDURE — 36415 COLL VENOUS BLD VENIPUNCTURE: CPT

## 2020-08-13 PROCEDURE — 80053 COMPREHEN METABOLIC PANEL: CPT

## 2020-08-13 PROCEDURE — 84100 ASSAY OF PHOSPHORUS: CPT

## 2020-08-19 DIAGNOSIS — E87.5 SERUM POTASSIUM ELEVATED: Primary | ICD-10-CM

## 2020-08-21 ENCOUNTER — LAB ENCOUNTER (OUTPATIENT)
Dept: LAB | Age: 75
End: 2020-08-21
Attending: NURSE PRACTITIONER
Payer: MEDICARE

## 2020-08-21 DIAGNOSIS — E87.5 SERUM POTASSIUM ELEVATED: ICD-10-CM

## 2020-08-21 LAB — POTASSIUM SERPL-SCNC: 3.9 MMOL/L (ref 3.5–5.1)

## 2020-08-21 PROCEDURE — 84132 ASSAY OF SERUM POTASSIUM: CPT

## 2020-08-21 PROCEDURE — 36415 COLL VENOUS BLD VENIPUNCTURE: CPT

## 2020-08-24 ENCOUNTER — OFFICE VISIT (OUTPATIENT)
Dept: FAMILY MEDICINE CLINIC | Facility: CLINIC | Age: 75
End: 2020-08-24
Payer: MEDICARE

## 2020-08-24 VITALS
DIASTOLIC BLOOD PRESSURE: 82 MMHG | TEMPERATURE: 98 F | HEART RATE: 70 BPM | SYSTOLIC BLOOD PRESSURE: 120 MMHG | WEIGHT: 100.38 LBS | BODY MASS INDEX: 16 KG/M2 | RESPIRATION RATE: 16 BRPM

## 2020-08-24 DIAGNOSIS — M81.0 AGE-RELATED OSTEOPOROSIS WITHOUT CURRENT PATHOLOGICAL FRACTURE: Primary | ICD-10-CM

## 2020-08-24 DIAGNOSIS — R63.4 WEIGHT LOSS, UNINTENTIONAL: ICD-10-CM

## 2020-08-24 PROCEDURE — 3079F DIAST BP 80-89 MM HG: CPT | Performed by: NURSE PRACTITIONER

## 2020-08-24 PROCEDURE — 99213 OFFICE O/P EST LOW 20 MIN: CPT | Performed by: NURSE PRACTITIONER

## 2020-08-24 PROCEDURE — 96372 THER/PROPH/DIAG INJ SC/IM: CPT | Performed by: NURSE PRACTITIONER

## 2020-08-24 PROCEDURE — 3074F SYST BP LT 130 MM HG: CPT | Performed by: NURSE PRACTITIONER

## 2020-08-24 NOTE — PROGRESS NOTES
Patient presents with:  Imm/Inj: prolia       HPI:  Presents for follow up of osteoporosis managed with prolia injections every 6 months, last injection was 2/21/2020. In office today reports feeling well.  Denies bone pains, joint swelling or redness, cortez Patient Active Problem List:     COPD, mild ([address]     Mixed hyperlipidemia     Essential hypertension     Acquired hypothyroidism     CAD (coronary artery disease)     Thoracic aorta atherosclerosis ([address]     History of venous thrombosis     Iron def Normocephalic and atraumatic. Cardiovascular: Normal rate, regular rhythm. No murmur. Pulmonary/Chest: No respiratory distress. Effort normal. Breath sounds clear bilaterally.  No wheezes, rhonchi or rales  Abdominal: Soft, non-tender, no masses, no org

## 2020-10-05 ENCOUNTER — OFFICE VISIT (OUTPATIENT)
Dept: FAMILY MEDICINE CLINIC | Facility: CLINIC | Age: 75
End: 2020-10-05
Payer: MEDICARE

## 2020-10-05 VITALS
SYSTOLIC BLOOD PRESSURE: 132 MMHG | HEART RATE: 64 BPM | BODY MASS INDEX: 17.89 KG/M2 | TEMPERATURE: 98 F | RESPIRATION RATE: 20 BRPM | HEIGHT: 67 IN | WEIGHT: 114 LBS | DIASTOLIC BLOOD PRESSURE: 80 MMHG

## 2020-10-05 DIAGNOSIS — R63.6 UNDERWEIGHT: ICD-10-CM

## 2020-10-05 DIAGNOSIS — E03.9 ACQUIRED HYPOTHYROIDISM: ICD-10-CM

## 2020-10-05 DIAGNOSIS — I10 ESSENTIAL HYPERTENSION: ICD-10-CM

## 2020-10-05 DIAGNOSIS — R63.4 WEIGHT LOSS, UNINTENTIONAL: Primary | ICD-10-CM

## 2020-10-05 PROCEDURE — 3079F DIAST BP 80-89 MM HG: CPT | Performed by: FAMILY MEDICINE

## 2020-10-05 PROCEDURE — 3008F BODY MASS INDEX DOCD: CPT | Performed by: FAMILY MEDICINE

## 2020-10-05 PROCEDURE — 99214 OFFICE O/P EST MOD 30 MIN: CPT | Performed by: FAMILY MEDICINE

## 2020-10-05 PROCEDURE — 3075F SYST BP GE 130 - 139MM HG: CPT | Performed by: FAMILY MEDICINE

## 2020-10-05 NOTE — PROGRESS NOTES
Patient presents with:  Weight Check      Subjective   HPI:   This is a 66year old female coming in for weight check, was down to 100, now back to 114. Moving to new home, but more stress as she can do more than  with his disabilities.    wilman restrepo dry. No rash noted. Psychiatric: She has a normal mood and affect.  Her speech is normal and behavior is normal. Judgment and thought content normal. Cognition and memory are normal.            Assessment and Plan:     Problem List Items Addressed This Vi

## 2020-10-06 NOTE — ASSESSMENT & PLAN NOTE
Stable, Continue present management.     Thyroid  (most recent labs)   Lab Results   Component Value Date/Time    TSH 0.698 08/13/2020 10:32 AM    T4F 1.4 07/05/2017 11:22 AM    TSHT4 1.13 04/11/2014 08:51 AM         Endocrine Medications          LEVOTHYRO

## 2020-10-09 ENCOUNTER — TELEPHONE (OUTPATIENT)
Dept: FAMILY MEDICINE CLINIC | Facility: CLINIC | Age: 75
End: 2020-10-09

## 2020-10-09 ENCOUNTER — APPOINTMENT (OUTPATIENT)
Dept: GENERAL RADIOLOGY | Age: 75
End: 2020-10-09
Attending: PHYSICIAN ASSISTANT
Payer: MEDICARE

## 2020-10-09 ENCOUNTER — HOSPITAL ENCOUNTER (OUTPATIENT)
Age: 75
Discharge: HOME | End: 2020-10-09
Payer: MEDICARE

## 2020-10-09 VITALS
BODY MASS INDEX: 18 KG/M2 | WEIGHT: 113 LBS | OXYGEN SATURATION: 100 % | TEMPERATURE: 99 F | HEART RATE: 65 BPM | RESPIRATION RATE: 16 BRPM | SYSTOLIC BLOOD PRESSURE: 150 MMHG | DIASTOLIC BLOOD PRESSURE: 77 MMHG

## 2020-10-09 DIAGNOSIS — S20.212A RIB CONTUSION, LEFT, INITIAL ENCOUNTER: Primary | ICD-10-CM

## 2020-10-09 PROCEDURE — 99213 OFFICE O/P EST LOW 20 MIN: CPT

## 2020-10-09 PROCEDURE — 99203 OFFICE O/P NEW LOW 30 MIN: CPT

## 2020-10-09 PROCEDURE — 71101 X-RAY EXAM UNILAT RIBS/CHEST: CPT | Performed by: PHYSICIAN ASSISTANT

## 2020-10-09 NOTE — TELEPHONE ENCOUNTER
Patient called requesting to speak with nurse states fell on Monday and has side near rib pain on her right side. States no bruising but very tender.

## 2020-10-09 NOTE — ED PROVIDER NOTES
Patient Seen in: [facility]      History   Patient presents with:  Fall  Pain    Stated Complaint: LEFT RIB PAIN S/P FALL ON TUESDAY    HPI    77-year-old female who comes in today complaining of left rib contusion that occurred • OTHER SURGICAL HISTORY      breast biospys   • OTHER SURGICAL HISTORY  2010    torn ligament in right arm   • UPPER GI ENDOSCOPY,EXAM  01/01/2006                Family history reviewed with patient/caregiver and is not pertinent to presenting problem. General: Abdomen is flat. Bowel sounds are normal. There is no distension. Palpations: Abdomen is soft. Tenderness: There is no abdominal tenderness. There is no right CVA tenderness, left CVA tenderness, guarding or rebound.    Musculoskeletal Incentive spirometer provided training given, I discussed with the patient left rib contusion, we discussed if she develops any shortness of breath worsening pain, fever or chills when to return, close follow-up with PCP in 1 week      The patient is in go

## 2020-10-09 NOTE — TELEPHONE ENCOUNTER
Patient reports she fell on on and has pain in left rib area. No visible bruise. It is painful to palpation and movement. Denies dyspnea and SOB, but does have pain with deep breaths or coughing/sneezing. Pain rated 8 out of 10.  Recommend patient go to IC

## 2020-10-16 ENCOUNTER — OFFICE VISIT (OUTPATIENT)
Dept: FAMILY MEDICINE CLINIC | Facility: CLINIC | Age: 75
End: 2020-10-16
Payer: MEDICARE

## 2020-10-16 VITALS
TEMPERATURE: 97 F | HEART RATE: 64 BPM | SYSTOLIC BLOOD PRESSURE: 120 MMHG | RESPIRATION RATE: 16 BRPM | DIASTOLIC BLOOD PRESSURE: 88 MMHG
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
## 2020-10-16 DIAGNOSIS — R07.81 RIB PAIN ON LEFT SIDE: Primary | ICD-10-CM

## 2020-10-16 PROCEDURE — 3079F DIAST BP 80-89 MM HG: CPT | Performed by: NURSE PRACTITIONER

## 2020-10-16 PROCEDURE — 3074F SYST BP LT 130 MM HG: CPT | Performed by: NURSE PRACTITIONER

## 2020-10-16 PROCEDURE — 99213 OFFICE O/P EST LOW 20 MIN: CPT | Performed by: NURSE PRACTITIONER

## 2020-10-16 RX ORDER — NAPROXEN 250 MG/1
250 TABLET ORAL 2 TIMES DAILY WITH MEALS
Qty: 6 TABLET | Refills: 0 | Status: SHIPPED | OUTPATIENT
Start: 2020-10-16 | End: 2021-11-10 | Stop reason: ALTCHOICE

## 2020-10-16 NOTE — PROGRESS NOTES
Patient presents with: Follow - Up: for fall       HPI:  Presents follow up of immediate care visit on 10/9 for fall. Stated fell onto recycling bin and she hit edge of bin into her left rib area. Stated area has remained painful.  Xrays in immediate care hypertension     Acquired hypothyroidism     CAD (coronary artery disease)     Thoracic aorta atherosclerosis (HCC)     History of venous thrombosis     Iron deficiency anemia     Cataracts, both eyes     Rosacea     Osteoporosis     Keratoconjunctivitis s appropriately. Speech fluid. Neck: Normal range of motion. Neck supple. Cardiovascular: Normal rate, regular rhythm. No murmur. Pulmonary/Chest: No respiratory distress.  Effort normal. Breath sounds clear bilaterally with good air movement to all ximena

## 2020-10-30 DIAGNOSIS — I10 HYPERTENSION GOAL BP (BLOOD PRESSURE) < 140/90: Chronic | ICD-10-CM

## 2020-11-03 RX ORDER — LOSARTAN POTASSIUM 50 MG/1
TABLET ORAL
Qty: 90 TABLET | Refills: 3 | Status: SHIPPED | OUTPATIENT
Start: 2020-11-03 | End: 2021-10-25

## 2020-11-04 NOTE — TELEPHONE ENCOUNTER
LOV 10/16/2020 W/Hayes rib pain  LOV w/ Glas 10/05/2020 addressed HTN   Last TSH 8/13/20 Within normal  Will refill

## 2020-11-04 NOTE — TELEPHONE ENCOUNTER
Losartan Potassium 50 Mg Tab [facility]     Sig: TAKE ONE TABLET BY MOUTH DAILY    Disp:  90 tablet    Refills:  0    Start: 10/30/2020    Class: Normal    Non-formulary For: Hypertension goal BP (blood pressure) < 140/90    Last ordered: 3 months ago by Maurisio Harkins

## 2020-11-16 ENCOUNTER — OFFICE VISIT (OUTPATIENT)
Dept: FAMILY MEDICINE CLINIC | Facility: CLINIC | Age: 75
End: 2020-11-16
Payer: MEDICARE

## 2020-11-16 VITALS
HEIGHT: 66 IN | SYSTOLIC BLOOD PRESSURE: 122 MMHG | BODY MASS INDEX: 18.05 KG/M2 | RESPIRATION RATE: 18 BRPM | TEMPERATURE: 97 F | WEIGHT: 112.31 LBS | HEART RATE: 68 BPM | DIASTOLIC BLOOD PRESSURE: 64 MMHG

## 2020-11-16 DIAGNOSIS — E03.9 ACQUIRED HYPOTHYROIDISM: ICD-10-CM

## 2020-11-16 DIAGNOSIS — R63.4 WEIGHT LOSS, UNINTENTIONAL: Primary | ICD-10-CM

## 2020-11-16 DIAGNOSIS — D50.0 IRON DEFICIENCY ANEMIA DUE TO CHRONIC BLOOD LOSS: Chronic | ICD-10-CM

## 2020-11-16 PROCEDURE — 99213 OFFICE O/P EST LOW 20 MIN: CPT | Performed by: FAMILY MEDICINE

## 2020-11-16 PROCEDURE — 3008F BODY MASS INDEX DOCD: CPT | Performed by: FAMILY MEDICINE

## 2020-11-16 PROCEDURE — 3078F DIAST BP <80 MM HG: CPT | Performed by: FAMILY MEDICINE

## 2020-11-16 PROCEDURE — 3074F SYST BP LT 130 MM HG: CPT | Performed by: FAMILY MEDICINE

## 2020-11-16 NOTE — PROGRESS NOTES
Patient presents with:  Weight Check: 6 week follow up. Subjective   HPI:   This is a 66year old female coming in for weight loss. Using added meal supplements. 2x sulement and 3x daily meals.    Wt Readings from Last 10 Encounters:  11/16/20 : 112 l normal. There is no abdominal tenderness. Neurological: She is alert and oriented to person, place, and time. Skin: Skin is warm and dry. No rash noted. Psychiatric: She has a normal mood and affect.  Judgment and thought content normal.            As

## 2021-01-10 DIAGNOSIS — I25.10 CORONARY ARTERY DISEASE INVOLVING NATIVE CORONARY ARTERY OF NATIVE HEART WITHOUT ANGINA PECTORIS: Chronic | ICD-10-CM

## 2021-01-10 DIAGNOSIS — I10 HYPERTENSION GOAL BP (BLOOD PRESSURE) < 140/90: Chronic | ICD-10-CM

## 2021-01-11 RX ORDER — MONTELUKAST SODIUM 10 MG/1
TABLET ORAL
Qty: 90 TABLET | Refills: 3 | Status: SHIPPED | OUTPATIENT
Start: 2021-01-11 | End: 2021-07-26

## 2021-01-16 DIAGNOSIS — E78.00 PURE HYPERCHOLESTEROLEMIA: Chronic | ICD-10-CM

## 2021-01-18 RX ORDER — SIMVASTATIN 40 MG
TABLET ORAL
Qty: 90 TABLET | Refills: 0 | Status: SHIPPED | OUTPATIENT
Start: 2021-01-18 | End: 2021-04-19

## 2021-01-18 NOTE — TELEPHONE ENCOUNTER
Requested Prescriptions     Pending Prescriptions Disp Refills   • SIMVASTATIN 40 MG Oral Tab [Pharmacy Med Name: Simvastatin 40 Mg Tab [facility]] 90 tablet 0     Sig: TAKE ONE TABLET BY MOUTH EVERY EVENING     LOV 11/16/2020     Patient was asked to follow-up

## 2021-01-19 ENCOUNTER — TELEPHONE (OUTPATIENT)
Dept: FAMILY MEDICINE CLINIC | Facility: CLINIC | Age: 76
End: 2021-01-19

## 2021-01-19 DIAGNOSIS — M81.0 AGE-RELATED OSTEOPOROSIS WITHOUT CURRENT PATHOLOGICAL FRACTURE: Primary | ICD-10-CM

## 2021-01-19 RX ORDER — CALCIUM CARBONATE/VITAMIN D3 600 MG-10
1 TABLET ORAL 2 TIMES DAILY
Refills: 0 | COMMUNITY
Start: 2021-01-19

## 2021-01-19 NOTE — TELEPHONE ENCOUNTER
Sander Nunez is scheduled for a MA visit with Av Roberson 2/1/2021. She received her last Prolia 8/24/2020. Spoke with Sander Nunez asking if she would consider changing her MA appointment to 2/22/2021 to be closer to the 6 month interval for her Prolia.  She verbalized un

## 2021-01-27 ENCOUNTER — TELEPHONE (OUTPATIENT)
Dept: FAMILY MEDICINE CLINIC | Facility: CLINIC | Age: 76
End: 2021-01-27

## 2021-02-01 DIAGNOSIS — Z23 NEED FOR VACCINATION: ICD-10-CM

## 2021-02-02 NOTE — TELEPHONE ENCOUNTER
Received Fax from [employer] asking for missing information:    HCP name: Talha Paredes  HCP NPI #:[id number]    This information was faxed to [employer] [phone]    Please order Prolia  Routed to [facility] Luci SORENSON

## 2021-02-09 ENCOUNTER — IMMUNIZATION (OUTPATIENT)
Dept: LAB | Age: 76
End: 2021-02-09
Attending: HOSPITALIST
Payer: MEDICARE

## 2021-02-09 DIAGNOSIS — Z23 NEED FOR VACCINATION: Primary | ICD-10-CM

## 2021-02-09 PROCEDURE — 0001A SARSCOV2 VAC 30MCG/0.3ML IM: CPT

## 2021-02-10 NOTE — TELEPHONE ENCOUNTER
Received Summary of Benefits from [address] [place] [address]  pre- certification is required  Entered all info into [employer]  Reference Number  [id number]  Case pending

## 2021-02-11 ENCOUNTER — LAB ENCOUNTER (OUTPATIENT)
Dept: LAB | Age: 76
End: 2021-02-11
Attending: FAMILY MEDICINE
Payer: MEDICARE

## 2021-02-11 DIAGNOSIS — E03.9 ACQUIRED HYPOTHYROIDISM: ICD-10-CM

## 2021-02-11 DIAGNOSIS — R63.4 WEIGHT LOSS, UNINTENTIONAL: ICD-10-CM

## 2021-02-11 DIAGNOSIS — I10 ESSENTIAL HYPERTENSION: ICD-10-CM

## 2021-02-11 DIAGNOSIS — M81.0 AGE-RELATED OSTEOPOROSIS WITHOUT CURRENT PATHOLOGICAL FRACTURE: ICD-10-CM

## 2021-02-11 LAB
ALBUMIN SERPL-MCNC: 4.2 G/DL (ref 3.4–5)
ALBUMIN/GLOB SERPL: 1.6 {RATIO} (ref 1–2)
ALP LIVER SERPL-CCNC: 53 U/L
ALT SERPL-CCNC: 23 U/L
ANION GAP SERPL CALC-SCNC: 6 MMOL/L (ref 0–18)
AST SERPL-CCNC: 18 U/L (ref 15–37)
BASOPHILS # BLD AUTO: 0.07 X10(3) UL (ref 0–0.2)
BASOPHILS NFR BLD AUTO: 1 %
BILIRUB SERPL-MCNC: 0.7 MG/DL (ref 0.1–2)
BUN BLD-MCNC: 13 MG/DL (ref 7–18)
BUN/CREAT SERPL: 16.3 (ref 10–20)
CALCIUM BLD-MCNC: 9.4 MG/DL (ref 8.5–10.1)
CHLORIDE SERPL-SCNC: 108 MMOL/L (ref 98–112)
CHOLEST SMN-MCNC: 156 MG/DL (ref ?–200)
CO2 SERPL-SCNC: 29 MMOL/L (ref 21–32)
CREAT BLD-MCNC: 0.8 MG/DL
DEPRECATED RDW RBC AUTO: 45.3 FL (ref 35.1–46.3)
EOSINOPHIL # BLD AUTO: 0.13 X10(3) UL (ref 0–0.7)
EOSINOPHIL NFR BLD AUTO: 1.9 %
ERYTHROCYTE [DISTWIDTH] IN BLOOD BY AUTOMATED COUNT: 12.6 % (ref 11–15)
GLOBULIN PLAS-MCNC: 2.7 G/DL (ref 2.8–4.4)
GLUCOSE BLD-MCNC: 81 MG/DL (ref 70–99)
HAV IGM SER QL: 2.4 MG/DL (ref 1.6–2.6)
HCT VFR BLD AUTO: 43.9 %
HDLC SERPL-MCNC: 99 MG/DL (ref 40–59)
HGB BLD-MCNC: 14.2 G/DL
IMM GRANULOCYTES # BLD AUTO: 0.01 X10(3) UL (ref 0–1)
IMM GRANULOCYTES NFR BLD: 0.1 %
LDLC SERPL CALC-MCNC: 46 MG/DL (ref ?–100)
LYMPHOCYTES # BLD AUTO: 1.34 X10(3) UL (ref 1–4)
LYMPHOCYTES NFR BLD AUTO: 19.9 %
M PROTEIN MFR SERPL ELPH: 6.9 G/DL (ref 6.4–8.2)
MCH RBC QN AUTO: 31.6 PG (ref 26–34)
MCHC RBC AUTO-ENTMCNC: 32.3 G/DL (ref 31–37)
MCV RBC AUTO: 97.8 FL
MONOCYTES # BLD AUTO: 0.66 X10(3) UL (ref 0.1–1)
MONOCYTES NFR BLD AUTO: 9.8 %
NEUTROPHILS # BLD AUTO: 4.54 X10 (3) UL (ref 1.5–7.7)
NEUTROPHILS # BLD AUTO: 4.54 X10(3) UL (ref 1.5–7.7)
NEUTROPHILS NFR BLD AUTO: 67.3 %
NONHDLC SERPL-MCNC: 57 MG/DL (ref ?–130)
OSMOLALITY SERPL CALC.SUM OF ELEC: 295 MOSM/KG (ref 275–295)
PATIENT FASTING Y/N/NP: YES
PATIENT FASTING Y/N/NP: YES
PHOSPHATE SERPL-MCNC: 3.5 MG/DL (ref 2.5–4.9)
PLATELET # BLD AUTO: 232 10(3)UL (ref 150–450)
POTASSIUM SERPL-SCNC: 3.8 MMOL/L (ref 3.5–5.1)
RBC # BLD AUTO: 4.49 X10(6)UL
SODIUM SERPL-SCNC: 143 MMOL/L (ref 136–145)
T4 FREE SERPL-MCNC: 1.2 NG/DL (ref 0.8–1.7)
TRIGL SERPL-MCNC: 56 MG/DL (ref 30–149)
TSI SER-ACNC: 1.01 MIU/ML (ref 0.36–3.74)
VIT D+METAB SERPL-MCNC: 43.2 NG/ML (ref 30–100)
VLDLC SERPL CALC-MCNC: 11 MG/DL (ref 0–30)
WBC # BLD AUTO: 6.8 X10(3) UL (ref 4–11)

## 2021-02-11 PROCEDURE — 82306 VITAMIN D 25 HYDROXY: CPT

## 2021-02-11 PROCEDURE — 36415 COLL VENOUS BLD VENIPUNCTURE: CPT

## 2021-02-11 PROCEDURE — 85025 COMPLETE CBC W/AUTO DIFF WBC: CPT

## 2021-02-11 PROCEDURE — 83735 ASSAY OF MAGNESIUM: CPT

## 2021-02-11 PROCEDURE — 84100 ASSAY OF PHOSPHORUS: CPT

## 2021-02-11 PROCEDURE — 84439 ASSAY OF FREE THYROXINE: CPT

## 2021-02-11 PROCEDURE — 80061 LIPID PANEL: CPT

## 2021-02-11 PROCEDURE — 84443 ASSAY THYROID STIM HORMONE: CPT

## 2021-02-11 PROCEDURE — 80053 COMPREHEN METABOLIC PANEL: CPT

## 2021-02-16 ENCOUNTER — TELEPHONE (OUTPATIENT)
Dept: FAMILY MEDICINE CLINIC | Facility: CLINIC | Age: 76
End: 2021-02-16

## 2021-02-19 ENCOUNTER — MA CHART PREP (OUTPATIENT)
Dept: FAMILY MEDICINE CLINIC | Facility: CLINIC | Age: 76
End: 2021-02-19

## 2021-02-19 PROBLEM — R63.4 UNINTENTIONAL WEIGHT LOSS: Status: ACTIVE | Noted: 2021-02-19

## 2021-02-19 PROBLEM — E55.9 VITAMIN D DEFICIENCY: Status: ACTIVE | Noted: 2021-02-19

## 2021-02-19 PROBLEM — I77.9 CAROTID ARTERY DISEASE (HCC): Status: ACTIVE | Noted: 2021-02-19

## 2021-02-19 PROBLEM — K21.9 GASTROESOPHAGEAL REFLUX DISEASE WITHOUT ESOPHAGITIS: Status: ACTIVE | Noted: 2021-02-19

## 2021-02-20 NOTE — ASSESSMENT & PLAN NOTE
Stable, Continue present management.     Thyroid  (most recent labs)   Lab Results   Component Value Date/Time    TSH 1.010 02/11/2021 09:52 AM    T4F 1.2 02/11/2021 09:52 AM    TSHT4 1.13 04/11/2014 08:51 AM         Endocrine Medications          LEVOTHYRO

## 2021-02-20 NOTE — ASSESSMENT & PLAN NOTE
Last Dexa Scan:   XR DEXA BONE DENSITOMETRY (CPT=77080) 06/12/2019    stable continue present management .  dexa this summer

## 2021-02-22 ENCOUNTER — TELEPHONE (OUTPATIENT)
Dept: FAMILY MEDICINE CLINIC | Facility: CLINIC | Age: 76
End: 2021-02-22

## 2021-02-22 ENCOUNTER — OFFICE VISIT (OUTPATIENT)
Dept: FAMILY MEDICINE CLINIC | Facility: CLINIC | Age: 76
End: 2021-02-22
Payer: MEDICARE

## 2021-02-22 VITALS
TEMPERATURE: 97 F | WEIGHT: 116.63 LBS | RESPIRATION RATE: 16 BRPM | DIASTOLIC BLOOD PRESSURE: 70 MMHG | HEIGHT: 67 IN | BODY MASS INDEX: 18.3 KG/M2 | HEART RATE: 66 BPM | SYSTOLIC BLOOD PRESSURE: 118 MMHG

## 2021-02-22 DIAGNOSIS — L71.9 ROSACEA: ICD-10-CM

## 2021-02-22 DIAGNOSIS — E78.2 MIXED HYPERLIPIDEMIA: ICD-10-CM

## 2021-02-22 DIAGNOSIS — J44.9 COPD, MILD (HCC): ICD-10-CM

## 2021-02-22 DIAGNOSIS — I73.9 PVD (PERIPHERAL VASCULAR DISEASE) (HCC): ICD-10-CM

## 2021-02-22 DIAGNOSIS — Z00.00 ANNUAL PHYSICAL EXAM: Primary | ICD-10-CM

## 2021-02-22 DIAGNOSIS — H35.033 HYPERTENSIVE RETINOPATHY OF BOTH EYES: ICD-10-CM

## 2021-02-22 DIAGNOSIS — I10 ESSENTIAL HYPERTENSION: ICD-10-CM

## 2021-02-22 DIAGNOSIS — Z86.718 HISTORY OF VENOUS THROMBOSIS: ICD-10-CM

## 2021-02-22 DIAGNOSIS — I25.10 CORONARY ARTERY DISEASE INVOLVING NATIVE CORONARY ARTERY OF NATIVE HEART WITHOUT ANGINA PECTORIS: Chronic | ICD-10-CM

## 2021-02-22 DIAGNOSIS — I70.0 THORACIC AORTA ATHEROSCLEROSIS (HCC): ICD-10-CM

## 2021-02-22 DIAGNOSIS — M25.532 LEFT WRIST PAIN: ICD-10-CM

## 2021-02-22 DIAGNOSIS — R63.4 UNINTENTIONAL WEIGHT LOSS: ICD-10-CM

## 2021-02-22 DIAGNOSIS — Z79.83 LONG-TERM CURRENT USE OF BISPHOSPHONATE: ICD-10-CM

## 2021-02-22 DIAGNOSIS — H25.9 AGE-RELATED CATARACT OF BOTH EYES, UNSPECIFIED AGE-RELATED CATARACT TYPE: Chronic | ICD-10-CM

## 2021-02-22 DIAGNOSIS — M47.816 ARTHRITIS OF FACET JOINT OF LUMBAR SPINE: ICD-10-CM

## 2021-02-22 DIAGNOSIS — E03.9 ACQUIRED HYPOTHYROIDISM: ICD-10-CM

## 2021-02-22 DIAGNOSIS — H16.223 KERATOCONJUNCTIVITIS SICCA OF BOTH EYES NOT SPECIFIED AS SJOGREN'S: ICD-10-CM

## 2021-02-22 DIAGNOSIS — Z00.00 ENCOUNTER FOR ANNUAL HEALTH EXAMINATION: ICD-10-CM

## 2021-02-22 DIAGNOSIS — I77.9 BILATERAL CAROTID ARTERY DISEASE, UNSPECIFIED TYPE (HCC): ICD-10-CM

## 2021-02-22 DIAGNOSIS — D50.0 IRON DEFICIENCY ANEMIA DUE TO CHRONIC BLOOD LOSS: Chronic | ICD-10-CM

## 2021-02-22 DIAGNOSIS — M81.0 AGE-RELATED OSTEOPOROSIS WITHOUT CURRENT PATHOLOGICAL FRACTURE: ICD-10-CM

## 2021-02-22 PROCEDURE — 96372 THER/PROPH/DIAG INJ SC/IM: CPT | Performed by: FAMILY MEDICINE

## 2021-02-22 PROCEDURE — 3074F SYST BP LT 130 MM HG: CPT | Performed by: FAMILY MEDICINE

## 2021-02-22 PROCEDURE — G0439 PPPS, SUBSEQ VISIT: HCPCS | Performed by: FAMILY MEDICINE

## 2021-02-22 PROCEDURE — 3008F BODY MASS INDEX DOCD: CPT | Performed by: FAMILY MEDICINE

## 2021-02-22 PROCEDURE — 96160 PT-FOCUSED HLTH RISK ASSMT: CPT | Performed by: FAMILY MEDICINE

## 2021-02-22 PROCEDURE — 99397 PER PM REEVAL EST PAT 65+ YR: CPT | Performed by: FAMILY MEDICINE

## 2021-02-22 PROCEDURE — 3078F DIAST BP <80 MM HG: CPT | Performed by: FAMILY MEDICINE

## 2021-02-22 RX ORDER — LEVOTHYROXINE SODIUM 0.07 MG/1
75 TABLET ORAL DAILY
Qty: 90 TABLET | Refills: 3 | Status: SHIPPED | OUTPATIENT
Start: 2021-02-22

## 2021-02-22 NOTE — PATIENT INSTRUCTIONS
Sakina Redd's SCREENING SCHEDULE   Tests on this list are recommended by your physician but may not be covered, or covered at this frequency, by your insurer. Please check with your insurance carrier before scheduling to verify coverage.    PREVENTATIV who are 73-68 years old and have smoked more than 100 cigarettes in their lifetime   • Anyone with a family history    Colorectal Cancer Screening  Covered up to Age 76     Colonoscopy Screen   Covered every 10 years- more often if abnormal There are no pr Annually to at least age 76, and as needed after 76 There are no preventive care reminders to display for this patient.  Please get this Mammogram regularly   Immunizations      Influenza  Covered Annually Orders placed or performed in visit on 12/03/14   • anyone to review and print using their home computer and printer. (the forms are also available in [address])  [email or web address]  This link also has information from the [address] [place] [address] regarding Advance Directives.

## 2021-02-22 NOTE — PROGRESS NOTES
HPI:   Tiffany Hernandez is a 66year old female who presents for a MA (Medicare Advantage) [facility] (Once per calendar year). Doing well, no new issues.  olga stewart  Annual Physical due on 07/20/2021        Fall/Risk Assessment   She has been screened f loss     Gastroesophageal reflux disease without esophagitis     Vitamin D deficiency     Carotid artery disease ([facility] [address]    Wt Readings from Last 3 Encounters:  02/22/21 : 116 lb 9.6 oz (52.9 kg)  11/16/20 : 112 lb 4.8 oz (50.9 kg)  10/09/20 : 113 lb (51.3 k SODIUM 75 MCG Oral Tab TAKE ONE TABLET BY MOUTH DAILY   • LOSARTAN POTASSIUM 50 MG Oral Tab TAKE ONE TABLET BY MOUTH DAILY   • METRONIDAZOLE 0.75 % External Cream APPLY TO AFFECTED AREA(S) TWICE DAILY   • MONTELUKAST SODIUM 10 MG Oral Tab TAKE ONE TABLET B that she does not use drugs. REVIEW OF SYSTEMS:   Review of Systems   Constitutional: Negative. Negative for activity change, appetite change, chills and fever. HENT: Negative. Eyes: Negative. Respiratory: Negative.   Negative for shortness of and EOM are normal.   Neck: Trachea normal and normal range of motion. Neck supple. Normal carotid pulses present. No edema present. No thyroid mass and no thyromegaly present.    Cardiovascular: Normal rate, regular rhythm, S1 normal, S2 normal, normal hea 04/26/2005   • Zoster Vaccine Live (Zostavax) 08/13/2009   • Zoster Vaccine Recombinant Adjuvanted (Shingrix) 02/08/2019, 04/23/2019        ASSESSMENT AND OTHER RELEVANT CHRONIC CONDITIONS:   Dereck Wheeler is a 66year old female who presents for a [employer] Levothyroxine 88         Current Assessment & Plan     Stable, Continue present management.     Thyroid  (most recent labs)   Lab Results   Component Value Date/Time    TSH 1.010 02/11/2021 09:52 AM    T4F 1.2 02/11/2021 09:52 AM    TSHT4 1.13 04/11/2014 08 Overview     Per Dr. Daniel Hector continue present management          Unintentional weight loss    Overview     Diagnosis 11/2020         Current Assessment & Plan     Continue to follow and work on nutrition Cholesterol (mg/dL)   Date Value   02/11/2021 46     LDL-CHOLESTEROL (mg/dL (calc))   Date Value   06/03/2015 59        EKG - w/ Initial Preventative Physical Exam only, or if medically necessary Electrocardiogram date12/17/2019     Colorectal Cancer Yaz history found Medium/high risk factors:   End-stage renal disease   Hemophiliacs who received Factor VIII or IX concentrates   Clients of institutions for the mentally retarded   Persons who live in the same house as a HepB virus carrier   Homosexual men

## 2021-02-24 ENCOUNTER — LAB ENCOUNTER (OUTPATIENT)
Dept: LAB | Age: 76
End: 2021-02-24
Attending: FAMILY MEDICINE
Payer: MEDICARE

## 2021-02-24 ENCOUNTER — HOSPITAL ENCOUNTER (OUTPATIENT)
Dept: GENERAL RADIOLOGY | Age: 76
Discharge: HOME | End: 2021-02-24
Attending: FAMILY MEDICINE
Payer: MEDICARE

## 2021-02-24 ENCOUNTER — HOSPITAL ENCOUNTER (OUTPATIENT)
Dept: BONE DENSITY | Age: 76
Discharge: HOME | End: 2021-02-24
Attending: FAMILY MEDICINE
Payer: MEDICARE

## 2021-02-24 DIAGNOSIS — Z79.83 LONG-TERM CURRENT USE OF BISPHOSPHONATE: ICD-10-CM

## 2021-02-24 DIAGNOSIS — M25.532 LEFT WRIST PAIN: ICD-10-CM

## 2021-02-24 DIAGNOSIS — M81.0 AGE-RELATED OSTEOPOROSIS WITHOUT CURRENT PATHOLOGICAL FRACTURE: ICD-10-CM

## 2021-02-24 LAB
ALBUMIN SERPL-MCNC: 4.2 G/DL (ref 3.4–5)
ALBUMIN/GLOB SERPL: 1.6 {RATIO} (ref 1–2)
ALP LIVER SERPL-CCNC: 55 U/L
ALT SERPL-CCNC: 23 U/L
ANION GAP SERPL CALC-SCNC: 5 MMOL/L (ref 0–18)
AST SERPL-CCNC: 11 U/L (ref 15–37)
BILIRUB SERPL-MCNC: 0.7 MG/DL (ref 0.1–2)
BUN BLD-MCNC: 15 MG/DL (ref 7–18)
BUN/CREAT SERPL: 19.5 (ref 10–20)
CALCIUM BLD-MCNC: 9.8 MG/DL (ref 8.5–10.1)
CHLORIDE SERPL-SCNC: 106 MMOL/L (ref 98–112)
CO2 SERPL-SCNC: 30 MMOL/L (ref 21–32)
CREAT BLD-MCNC: 0.77 MG/DL
GLOBULIN PLAS-MCNC: 2.7 G/DL (ref 2.8–4.4)
GLUCOSE BLD-MCNC: 86 MG/DL (ref 70–99)
HAV IGM SER QL: 2.4 MG/DL (ref 1.6–2.6)
M PROTEIN MFR SERPL ELPH: 6.9 G/DL (ref 6.4–8.2)
OSMOLALITY SERPL CALC.SUM OF ELEC: 292 MOSM/KG (ref 275–295)
PATIENT FASTING Y/N/NP: YES
PHOSPHATE SERPL-MCNC: 2.9 MG/DL (ref 2.5–4.9)
POTASSIUM SERPL-SCNC: 3.9 MMOL/L (ref 3.5–5.1)
SODIUM SERPL-SCNC: 141 MMOL/L (ref 136–145)
VIT D+METAB SERPL-MCNC: 35.5 NG/ML (ref 30–100)

## 2021-02-24 PROCEDURE — 82306 VITAMIN D 25 HYDROXY: CPT

## 2021-02-24 PROCEDURE — 83735 ASSAY OF MAGNESIUM: CPT

## 2021-02-24 PROCEDURE — 80053 COMPREHEN METABOLIC PANEL: CPT

## 2021-02-24 PROCEDURE — 84100 ASSAY OF PHOSPHORUS: CPT

## 2021-02-24 PROCEDURE — 36415 COLL VENOUS BLD VENIPUNCTURE: CPT

## 2021-02-24 PROCEDURE — 77080 DXA BONE DENSITY AXIAL: CPT | Performed by: FAMILY MEDICINE

## 2021-02-24 PROCEDURE — 73110 X-RAY EXAM OF WRIST: CPT | Performed by: FAMILY MEDICINE

## 2021-03-02 ENCOUNTER — IMMUNIZATION (OUTPATIENT)
Dept: LAB | Age: 76
End: 2021-03-02
Attending: HOSPITALIST
Payer: MEDICARE

## 2021-03-02 DIAGNOSIS — Z23 NEED FOR VACCINATION: Primary | ICD-10-CM

## 2021-03-02 PROCEDURE — 0002A SARSCOV2 VAC 30MCG/0.3ML IM: CPT

## 2021-04-18 DIAGNOSIS — E78.00 PURE HYPERCHOLESTEROLEMIA: Chronic | ICD-10-CM

## 2021-04-19 RX ORDER — SIMVASTATIN 40 MG
TABLET ORAL
Qty: 90 TABLET | Refills: 1 | Status: SHIPPED | OUTPATIENT
Start: 2021-04-19 | End: 2021-10-19

## 2021-04-19 NOTE — TELEPHONE ENCOUNTER
Requested Prescriptions     Pending Prescriptions Disp Refills   • SIMVASTATIN 40 MG Oral Tab [Pharmacy Med Name: Simvastatin 40 Mg Tab [facility]] 90 tablet 0     Sig: TAKE ONE TABLET BY MOUTH EVERY EVENING     LOV 2/22/2021     Patient was asked to follow-up i

## 2021-07-19 ENCOUNTER — HOSPITAL ENCOUNTER (OUTPATIENT)
Age: 76
Discharge: HOME | End: 2021-07-19
Payer: MEDICARE

## 2021-07-19 VITALS
DIASTOLIC BLOOD PRESSURE: 86 MMHG | OXYGEN SATURATION: 96 % | RESPIRATION RATE: 16 BRPM | HEART RATE: 67 BPM | TEMPERATURE: 98 F | SYSTOLIC BLOOD PRESSURE: 132 MMHG
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
## 2021-07-19 DIAGNOSIS — S00.461A INSECT BITE OF RIGHT EAR, INITIAL ENCOUNTER: Primary | ICD-10-CM

## 2021-07-19 PROCEDURE — 99213 OFFICE O/P EST LOW 20 MIN: CPT

## 2021-07-19 RX ORDER — VALACYCLOVIR HYDROCHLORIDE 1 G/1
1 TABLET, FILM COATED ORAL 3 TIMES DAILY
Qty: 21 TABLET | Refills: 0 | Status: SHIPPED | OUTPATIENT
Start: 2021-07-19 | End: 2021-07-26

## 2021-07-19 RX ORDER — DOXYCYCLINE HYCLATE 100 MG/1
100 CAPSULE ORAL 2 TIMES DAILY
Qty: 14 CAPSULE | Refills: 0 | Status: SHIPPED | OUTPATIENT
Start: 2021-07-19 | End: 2021-07-26

## 2021-07-19 RX ORDER — PREDNISONE 20 MG/1
TABLET ORAL
Qty: 21 TABLET | Refills: 0 | Status: SHIPPED | OUTPATIENT
Start: 2021-07-19 | End: 2021-07-31

## 2021-07-19 NOTE — ED INITIAL ASSESSMENT (HPI)
Pt was mowing the lawn and her rt ear started swelling and itching, thought maybe a bug bite, but then the rt side of face and rt eye began to get red sensitive and swollen

## 2021-07-19 NOTE — ED PROVIDER NOTES
Patient Seen in: [facility]      History   Patient presents with:  Swelling Edema    Stated Complaint: BUG BITE RIGHT EAR X 1 DAY    HPI/Subjective: This 78-year-old female presents to immediate care with right ear redness and swelling. 01/01/2006                Social History    Tobacco Use      Smoking status: Never Smoker      Smokeless tobacco: Never Used    Vaping Use      Vaping Use: Never used    Alcohol use:  Yes      Alcohol/week: 1.0 - 2.0 standard drinks      Types: 1 Glasses of treat as cellulitis at this time as there is no burning pain or vesicles noted. Patient given prescription for antibiotic and steroids.   Written prescription for Valtrex given and advised to start in the event of any vesicles or sharp burning pain

## 2021-07-20 ENCOUNTER — TELEPHONE (OUTPATIENT)
Dept: FAMILY MEDICINE CLINIC | Facility: CLINIC | Age: 76
End: 2021-07-20

## 2021-07-20 DIAGNOSIS — M81.0 AGE-RELATED OSTEOPOROSIS WITHOUT CURRENT PATHOLOGICAL FRACTURE: Primary | ICD-10-CM

## 2021-07-20 NOTE — TELEPHONE ENCOUNTER
Rojas Duran is due for her Prolia Injection August 2021. She has an appointment with Che DAMIAN 8/23/2021. She had a Dexa Scan 2/24/2021 and currently is taking Calcium and Vitamin D as directed. February 11,2021 the following approval was received.      MAKAYLA

## 2021-08-17 ENCOUNTER — LAB ENCOUNTER (OUTPATIENT)
Dept: LAB | Age: 76
End: 2021-08-17
Attending: FAMILY MEDICINE
Payer: MEDICARE

## 2021-08-17 DIAGNOSIS — M81.0 AGE-RELATED OSTEOPOROSIS WITHOUT CURRENT PATHOLOGICAL FRACTURE: ICD-10-CM

## 2021-08-17 LAB
CALCIUM BLD-MCNC: 9.2 MG/DL (ref 8.5–10.1)
CREAT BLD-MCNC: 0.96 MG/DL
HAV IGM SER QL: 2.3 MG/DL (ref 1.6–2.6)
PHOSPHATE SERPL-MCNC: 3.1 MG/DL (ref 2.5–4.9)
VIT D+METAB SERPL-MCNC: 46.7 NG/ML (ref 30–100)

## 2021-08-17 PROCEDURE — 84100 ASSAY OF PHOSPHORUS: CPT

## 2021-08-17 PROCEDURE — 82565 ASSAY OF CREATININE: CPT

## 2021-08-17 PROCEDURE — 82310 ASSAY OF CALCIUM: CPT

## 2021-08-17 PROCEDURE — 36415 COLL VENOUS BLD VENIPUNCTURE: CPT

## 2021-08-17 PROCEDURE — 82306 VITAMIN D 25 HYDROXY: CPT

## 2021-08-17 PROCEDURE — 83735 ASSAY OF MAGNESIUM: CPT

## 2021-08-17 NOTE — TELEPHONE ENCOUNTER
Spoke with Ángel Arias reminding her to have her non fasting Prolia labs done before her appointment 8/23/2021 with Anthony DAMIAN. Ángel Arias verbalized understanding. She had forgotten labs were needed and will have them done.

## 2021-08-23 ENCOUNTER — OFFICE VISIT (OUTPATIENT)
Dept: FAMILY MEDICINE CLINIC | Facility: CLINIC | Age: 76
End: 2021-08-23
Payer: MEDICARE

## 2021-08-23 VITALS
DIASTOLIC BLOOD PRESSURE: 70 MMHG | HEART RATE: 68 BPM | RESPIRATION RATE: 16 BRPM | TEMPERATURE: 97 F | BODY MASS INDEX: 18 KG/M2 | WEIGHT: 116 LBS | HEIGHT: 67.32 IN | SYSTOLIC BLOOD PRESSURE: 126 MMHG

## 2021-08-23 DIAGNOSIS — M81.0 AGE-RELATED OSTEOPOROSIS WITHOUT CURRENT PATHOLOGICAL FRACTURE: Primary | ICD-10-CM

## 2021-08-23 PROCEDURE — 3078F DIAST BP <80 MM HG: CPT | Performed by: NURSE PRACTITIONER

## 2021-08-23 PROCEDURE — 3008F BODY MASS INDEX DOCD: CPT | Performed by: NURSE PRACTITIONER

## 2021-08-23 PROCEDURE — 99213 OFFICE O/P EST LOW 20 MIN: CPT | Performed by: NURSE PRACTITIONER

## 2021-08-23 PROCEDURE — 96372 THER/PROPH/DIAG INJ SC/IM: CPT | Performed by: NURSE PRACTITIONER

## 2021-08-23 PROCEDURE — 3074F SYST BP LT 130 MM HG: CPT | Performed by: NURSE PRACTITIONER

## 2021-08-23 RX ORDER — CHLORHEXIDINE GLUCONATE 0.12 MG/ML
RINSE ORAL
COMMUNITY
Start: 2021-07-01

## 2021-09-11 ENCOUNTER — HOSPITAL ENCOUNTER (OUTPATIENT)
Age: 76
Discharge: HOME | End: 2021-09-11
Attending: EMERGENCY MEDICINE
Payer: MEDICARE

## 2021-09-11 VITALS
HEART RATE: 66 BPM | RESPIRATION RATE: 20 BRPM | SYSTOLIC BLOOD PRESSURE: 150 MMHG | TEMPERATURE: 99 F | OXYGEN SATURATION: 100 % | DIASTOLIC BLOOD PRESSURE: 80 MMHG

## 2021-09-11 DIAGNOSIS — H61.21 IMPACTED CERUMEN OF RIGHT EAR: Primary | ICD-10-CM

## 2021-09-11 DIAGNOSIS — H81.10 BENIGN PAROXYSMAL POSITIONAL VERTIGO, UNSPECIFIED LATERALITY: ICD-10-CM

## 2021-09-11 LAB
BUN BLD-MCNC: 14 MG/DL (ref 7–18)
CHLORIDE BLD-SCNC: 100 MMOL/L (ref 98–112)
CO2 BLD-SCNC: 23 MMOL/L (ref 21–32)
CREAT BLD-MCNC: 0.7 MG/DL
GLUCOSE BLD-MCNC: 132 MG/DL (ref 70–99)
HCT VFR BLD CALC: 38 %
ISTAT IONIZED CALCIUM FOR CHEM 8: 1.23 MMOL/L (ref 1.12–1.32)
POTASSIUM BLD-SCNC: 3.8 MMOL/L (ref 3.6–5.1)
SODIUM BLD-SCNC: 137 MMOL/L (ref 136–145)

## 2021-09-11 PROCEDURE — 80047 BASIC METABLC PNL IONIZED CA: CPT

## 2021-09-11 PROCEDURE — 93005 ELECTROCARDIOGRAM TRACING: CPT

## 2021-09-11 PROCEDURE — 99214 OFFICE O/P EST MOD 30 MIN: CPT

## 2021-09-11 PROCEDURE — 93010 ELECTROCARDIOGRAM REPORT: CPT

## 2021-09-11 PROCEDURE — 69209 REMOVE IMPACTED EAR WAX UNI: CPT

## 2021-09-11 RX ORDER — MECLIZINE HYDROCHLORIDE 25 MG/1
25 TABLET ORAL 3 TIMES DAILY PRN
Qty: 20 TABLET | Refills: 0 | Status: SHIPPED | OUTPATIENT
Start: 2021-09-11 | End: 2021-11-10 | Stop reason: ALTCHOICE

## 2021-09-11 NOTE — ED PROVIDER NOTES
Patient Seen in: [facility]      History   Patient presents with:  Dizziness    Stated Complaint: dizzy nausea    Subjective:   HPI    68-year-old female complaint of dizziness she describes a vertigo-like sensation when she rolled over i benign.    • OTHER SURGICAL HISTORY      breast biospys   • OTHER SURGICAL HISTORY  2010    torn ligament in right arm   • UPPER GI ENDOSCOPY,EXAM  01/01/2006                Social History    Tobacco Use      Smoking status: Never Smoker      Smokeless t Notable for the following components:       Result Value    ISTAT Glucose 132 (*)     All other components within normal limits     EKG    Rate, intervals and axes as noted on EKG Report.   Rate: 65  Rhythm: Sinus Rhythm  Reading: Septal infarct vladimir Olivarez

## 2021-09-11 NOTE — ED INITIAL ASSESSMENT (HPI)
Pt has had some mild ear pain for 3 weeks, she woke at 5am and was very dizzy, and her  gave her some meclizine which calmed her symptoms, but did not relive them  She is a little nauseated but denies any chest pain, vomiting or diarrhea and is not

## 2021-09-12 DIAGNOSIS — M81.0 AGE-RELATED OSTEOPOROSIS WITHOUT CURRENT PATHOLOGICAL FRACTURE: ICD-10-CM

## 2021-09-13 LAB
ATRIAL RATE: 65 BPM
P AXIS: 76 DEGREES
P-R INTERVAL: 180 MS
Q-T INTERVAL: 410 MS
QRS DURATION: 84 MS
QTC CALCULATION (BEZET): 426 MS
R AXIS: 52 DEGREES
T AXIS: 70 DEGREES
VENTRICULAR RATE: 65 BPM

## 2021-09-16 RX ORDER — ERGOCALCIFEROL 1.25 MG/1
CAPSULE ORAL
Qty: 3 CAPSULE | Refills: 3 | Status: SHIPPED | OUTPATIENT
Start: 2021-09-16

## 2021-09-16 NOTE — TELEPHONE ENCOUNTER
Refill request for:    Requested Prescriptions     Pending Prescriptions Disp Refills   • ERGOCALCIFEROL 1.25 MG (15882 UT) Oral Cap [Pharmacy Med Name: Vitamin D 50,000 Unit Cap Stri] 3 capsule 0     Sig: TAKE 1 CAPSULE BY MOUTH MONTHLY        Last Prescr

## 2021-10-15 ENCOUNTER — PATIENT MESSAGE (OUTPATIENT)
Dept: FAMILY MEDICINE CLINIC | Facility: CLINIC | Age: 76
End: 2021-10-15

## 2021-10-15 NOTE — TELEPHONE ENCOUNTER
From: Lashell Fisher  To: Beryl Rascon MD  Sent: 10/15/2021 10:05 AM CDT  Subject: 6 month appointment    My 6-month appointment is overdue and I would like to schedule if this service is being done.

## 2021-10-17 DIAGNOSIS — E78.00 PURE HYPERCHOLESTEROLEMIA: Chronic | ICD-10-CM

## 2021-10-18 NOTE — TELEPHONE ENCOUNTER
Pt scheduled her medication follow up with Dr. Subhash Reyes for 11/10/21 she only has enough medication to get thru this week.

## 2021-10-19 RX ORDER — SIMVASTATIN 40 MG
TABLET ORAL
Qty: 90 TABLET | Refills: 1 | Status: SHIPPED | OUTPATIENT
Start: 2021-10-19

## 2021-10-19 NOTE — TELEPHONE ENCOUNTER
Refill request for Simvastatin   LOV 8/23/21- Prolia  Last Physical 2/22/21  Last labs 2/11/21  Refilled per protocol

## 2021-10-24 DIAGNOSIS — I10 HYPERTENSION GOAL BP (BLOOD PRESSURE) < 140/90: Chronic | ICD-10-CM

## 2021-10-25 RX ORDER — LOSARTAN POTASSIUM 50 MG/1
TABLET ORAL
Qty: 90 TABLET | Refills: 0 | Status: SHIPPED | OUTPATIENT
Start: 2021-10-25 | End: 2022-01-24

## 2021-10-25 NOTE — TELEPHONE ENCOUNTER
Requested Prescriptions     Pending Prescriptions Disp Refills   • LOSARTAN 50 MG Oral Tab [Pharmacy Med Name: Losartan Potassium 50 Mg Tab [facility]] 90 tablet 0     Sig: TAKE ONE TABLET BY MOUTH DAILY     LOV 8/23/2021     Patient was asked to follow-up in: F

## 2021-11-09 NOTE — ASSESSMENT & PLAN NOTE
Stable, Continue present management.     Blood Pressure and Cardiac Medications          LOSARTAN 50 MG Oral Tab

## 2021-11-09 NOTE — ASSESSMENT & PLAN NOTE
Stable, continue present management Last Dexa Scan: XR DEXA BONE DENSITOMETRY (CPT=77080) 02/24/2021

## 2021-11-10 ENCOUNTER — OFFICE VISIT (OUTPATIENT)
Dept: FAMILY MEDICINE CLINIC | Facility: CLINIC | Age: 76
End: 2021-11-10
Payer: MEDICARE

## 2021-11-10 VITALS
HEART RATE: 74 BPM | BODY MASS INDEX: 18.51 KG/M2 | HEIGHT: 66 IN | SYSTOLIC BLOOD PRESSURE: 120 MMHG | DIASTOLIC BLOOD PRESSURE: 60 MMHG | RESPIRATION RATE: 168 BRPM | WEIGHT: 115.19 LBS

## 2021-11-10 DIAGNOSIS — E78.2 MIXED HYPERLIPIDEMIA: ICD-10-CM

## 2021-11-10 DIAGNOSIS — J44.9 COPD, MILD (HCC): ICD-10-CM

## 2021-11-10 DIAGNOSIS — E46 PROTEIN-CALORIE MALNUTRITION, UNSPECIFIED SEVERITY (HCC): ICD-10-CM

## 2021-11-10 DIAGNOSIS — M81.0 AGE-RELATED OSTEOPOROSIS WITHOUT CURRENT PATHOLOGICAL FRACTURE: Primary | ICD-10-CM

## 2021-11-10 DIAGNOSIS — R73.9 HYPERGLYCEMIA: ICD-10-CM

## 2021-11-10 DIAGNOSIS — Z00.00 LABORATORY EXAMINATION ORDERED AS PART OF A ROUTINE GENERAL MEDICAL EXAMINATION: ICD-10-CM

## 2021-11-10 DIAGNOSIS — I25.10 CORONARY ARTERY DISEASE INVOLVING NATIVE CORONARY ARTERY OF NATIVE HEART WITHOUT ANGINA PECTORIS: ICD-10-CM

## 2021-11-10 DIAGNOSIS — E03.8 SUBCLINICAL HYPOTHYROIDISM: ICD-10-CM

## 2021-11-10 DIAGNOSIS — E61.1 IRON DEFICIENCY: ICD-10-CM

## 2021-11-10 DIAGNOSIS — I10 ESSENTIAL HYPERTENSION: ICD-10-CM

## 2021-11-10 PROBLEM — E44.0 MODERATE PROTEIN-CALORIE MALNUTRITION (HCC): Status: ACTIVE | Noted: 2021-11-10

## 2021-11-10 PROCEDURE — G0008 ADMIN INFLUENZA VIRUS VAC: HCPCS | Performed by: FAMILY MEDICINE

## 2021-11-10 PROCEDURE — 3074F SYST BP LT 130 MM HG: CPT | Performed by: FAMILY MEDICINE

## 2021-11-10 PROCEDURE — 3078F DIAST BP <80 MM HG: CPT | Performed by: FAMILY MEDICINE

## 2021-11-10 PROCEDURE — 3008F BODY MASS INDEX DOCD: CPT | Performed by: FAMILY MEDICINE

## 2021-11-10 PROCEDURE — 90662 IIV NO PRSV INCREASED AG IM: CPT | Performed by: FAMILY MEDICINE

## 2021-11-10 PROCEDURE — 99214 OFFICE O/P EST MOD 30 MIN: CPT | Performed by: FAMILY MEDICINE

## 2021-11-10 NOTE — PROGRESS NOTES
Subjective:     Patient presents with:  Medication Follow-Up: 6 months      Subjective   Lashell Fisher is a 66year old female who presents for recheck of her hypertension. She has been taking medications as instructed, with no medication side effects. Normal heart sounds. No murmur heard. Pulmonary:      Effort: Pulmonary effort is normal. No respiratory distress. Breath sounds: Normal breath sounds. No wheezing.    Abdominal:      General: Bowel sounds are normal.      Palpations: Abdomen is s unspecified severity ([facility]  8. Laboratory examination ordered as part of a routine general medical examination  -     Comp Metabolic Panel (14); Future; Expected date: 02/01/2022  -     Lipid Panel;  Future; Expected date: 02/01/2022  -     TSH W Reflex To

## 2022-01-23 DIAGNOSIS — I10 HYPERTENSION GOAL BP (BLOOD PRESSURE) < 140/90: Chronic | ICD-10-CM

## 2022-01-24 RX ORDER — LOSARTAN POTASSIUM 50 MG/1
TABLET ORAL
Qty: 90 TABLET | Refills: 0 | Status: SHIPPED | OUTPATIENT
Start: 2022-01-24 | End: 2022-01-25

## 2022-01-24 NOTE — TELEPHONE ENCOUNTER
Requested Prescriptions     Pending Prescriptions Disp Refills   • LOSARTAN 50 MG Oral Tab [Pharmacy Med Name: Losartan Potassium 50 Mg Tab [facility]] 90 tablet 0     Sig: TAKE ONE TABLET BY MOUTH DAILY     LOV 11/10/2021     Patient was asked to follow-up in:

## 2022-01-25 DIAGNOSIS — I10 HYPERTENSION GOAL BP (BLOOD PRESSURE) < 140/90: Chronic | ICD-10-CM

## 2022-01-25 RX ORDER — LOSARTAN POTASSIUM 50 MG/1
TABLET ORAL
Qty: 90 TABLET | Refills: 0 | Status: SHIPPED | OUTPATIENT
Start: 2022-01-25

## 2022-01-25 NOTE — TELEPHONE ENCOUNTER
Medication refilled per protocol.      Requested Prescriptions     Signed Prescriptions Disp Refills   • LOSARTAN 50 MG Oral Tab 90 tablet 0     Sig: TAKE ONE TABLET BY MOUTH DAILY     Authorizing Provider: Sedrick Mascorro     Ordering User: Sugey Garcia

## 2022-01-28 ENCOUNTER — TELEPHONE (OUTPATIENT)
Dept: FAMILY MEDICINE CLINIC | Facility: CLINIC | Age: 77
End: 2022-01-28

## 2022-01-28 NOTE — TELEPHONE ENCOUNTER
I spoke with Terry Keys regarding her upcoming appointment for Prolia 2/23/2022 with Jim DAMIAN. She still has [facility] insurance and needed Authorization previously. She is taking Vitamin D and Calcium supplements and her Dexa Scan is up to date,done 2/24/2021. She is aware and will have her labs drawn no more than 2 weeks prior to her Prolia visit.     Awaiting [employer] SOB   Routed to [facility]

## 2022-02-01 NOTE — TELEPHONE ENCOUNTER
Received faxed response fro [facility] [place] Prolia  Approved 02/21/2020 through 12/31/2022  Approval# [id number]

## 2022-02-01 NOTE — TELEPHONE ENCOUNTER
Submitted to [facility] [employer] through [employer]  Reference Number  [id number]    Status  PENDED    Review Reason 1  Requires Medical Review    Message  This case requires further review. You will be contacted if additional information is needed.

## 2022-02-07 ENCOUNTER — LAB ENCOUNTER (OUTPATIENT)
Dept: LAB | Age: 77
End: 2022-02-07
Attending: FAMILY MEDICINE
Payer: MEDICARE

## 2022-02-11 LAB
% SATURATION: 46 % (CALC) (ref 16–45)
ABSOLUTE BASOPHILS: 48 CELLS/UL (ref 0–200)
ABSOLUTE EOSINOPHILS: 62 CELLS/UL (ref 15–500)
ABSOLUTE LYMPHOCYTES: 1338 CELLS/UL (ref 850–3900)
ABSOLUTE MONOCYTES: 475 CELLS/UL (ref 200–950)
ABSOLUTE NEUTROPHILS: 2477 CELLS/UL (ref 1500–7800)
ALBUMIN/GLOBULIN RATIO: 2.4 (CALC) (ref 1–2.5)
ALBUMIN: 4.5 G/DL (ref 3.6–5.1)
ALKALINE PHOSPHATASE: 44 U/L (ref 37–153)
ALT: 14 U/L (ref 6–29)
AST: 20 U/L (ref 10–35)
BASOPHILS: 1.1 %
BILIRUBIN, TOTAL: 0.8 MG/DL (ref 0.2–1.2)
BUN: 12 MG/DL (ref 7–25)
CALCIUM: 9.7 MG/DL (ref 8.6–10.4)
CARBON DIOXIDE: 30 MMOL/L (ref 20–32)
CHLORIDE: 104 MMOL/L (ref 98–110)
CHOL/HDLC RATIO: 1.8 (CALC)
CHOLESTEROL, TOTAL: 162 MG/DL
CREATININE: 0.87 MG/DL (ref 0.6–0.93)
EGFR IF AFRICN AM: 74 ML/MIN/1.73M2
EGFR IF NONAFRICN AM: 64 ML/MIN/1.73M2
EOSINOPHILS: 1.4 %
FERRITIN: 63 NG/ML (ref 16–288)
GLOBULIN: 1.9 G/DL (CALC) (ref 1.9–3.7)
GLUCOSE: 85 MG/DL (ref 65–99)
HDL CHOLESTEROL: 92 MG/DL
HEMATOCRIT: 40.3 % (ref 35–45)
HEMOGLOBIN A1C: 4.9 % OF TOTAL HGB
HEMOGLOBIN: 13.5 G/DL (ref 11.7–15.5)
IRON BINDING CAPACITY: 302 MCG/DL (CALC) (ref 250–450)
IRON, TOTAL: 140 MCG/DL (ref 45–160)
LDL-CHOLESTEROL: 57 MG/DL (CALC)
LYMPHOCYTES: 30.4 %
MAGNESIUM: 2.2 MG/DL (ref 1.5–2.5)
MCH: 31.8 PG (ref 27–33)
MCHC: 33.5 G/DL (ref 32–36)
MCV: 94.8 FL (ref 80–100)
MONOCYTES: 10.8 %
MPV: 10.8 FL (ref 7.5–12.5)
NEUTROPHILS: 56.3 %
NON-HDL CHOLESTEROL: 70 MG/DL (CALC)
PHOSPHATE (AS PHOSPHORUS): 3.4 MG/DL (ref 2.1–4.3)
PLATELET COUNT: 213 THOUSAND/UL (ref 140–400)
POTASSIUM: 3.9 MMOL/L (ref 3.5–5.3)
PROTEIN, TOTAL: 6.4 G/DL (ref 6.1–8.1)
RDW: 12.2 % (ref 11–15)
RED BLOOD CELL COUNT: 4.25 MILLION/UL (ref 3.8–5.1)
SODIUM: 140 MMOL/L (ref 135–146)
TRIGLYCERIDES: 58 MG/DL
TSH W/REFLEX TO FT4: 1.35 MIU/L (ref 0.4–4.5)
VITAMIN D, 1,25 (OH)2,$TOTAL: 24 PG/ML (ref 18–72)
VITAMIN D2, 1,25 (OH)2: <8 PG/ML
VITAMIN D3, 1,25 (OH)2: 24 PG/ML
WHITE BLOOD CELL COUNT: 4.4 THOUSAND/UL (ref 3.8–10.8)

## 2022-02-23 ENCOUNTER — OFFICE VISIT (OUTPATIENT)
Dept: FAMILY MEDICINE CLINIC | Facility: CLINIC | Age: 77
End: 2022-02-23
Payer: MEDICARE

## 2022-02-23 VITALS
BODY MASS INDEX: 18.68 KG/M2 | HEIGHT: 67 IN | WEIGHT: 119 LBS | SYSTOLIC BLOOD PRESSURE: 126 MMHG | DIASTOLIC BLOOD PRESSURE: 70 MMHG | HEART RATE: 60 BPM | RESPIRATION RATE: 16 BRPM | TEMPERATURE: 97 F

## 2022-02-23 DIAGNOSIS — M81.0 AGE-RELATED OSTEOPOROSIS WITHOUT CURRENT PATHOLOGICAL FRACTURE: Primary | ICD-10-CM

## 2022-02-23 DIAGNOSIS — R22.32 MASS OF LEFT HAND: ICD-10-CM

## 2022-02-23 DIAGNOSIS — R07.81 RIB PAIN ON LEFT SIDE: ICD-10-CM

## 2022-02-23 DIAGNOSIS — M25.522 ELBOW PAIN, LEFT: ICD-10-CM

## 2022-02-23 PROCEDURE — 99214 OFFICE O/P EST MOD 30 MIN: CPT | Performed by: NURSE PRACTITIONER

## 2022-02-23 PROCEDURE — 3008F BODY MASS INDEX DOCD: CPT | Performed by: NURSE PRACTITIONER

## 2022-02-23 PROCEDURE — 3074F SYST BP LT 130 MM HG: CPT | Performed by: NURSE PRACTITIONER

## 2022-02-23 PROCEDURE — 96372 THER/PROPH/DIAG INJ SC/IM: CPT | Performed by: NURSE PRACTITIONER

## 2022-02-23 PROCEDURE — 3078F DIAST BP <80 MM HG: CPT | Performed by: NURSE PRACTITIONER

## 2022-02-23 RX ORDER — NAPROXEN 250 MG/1
250 TABLET ORAL 2 TIMES DAILY WITH MEALS
Qty: 10 TABLET | Refills: 0 | Status: SHIPPED | OUTPATIENT
Start: 2022-02-23

## 2022-02-23 NOTE — PATIENT INSTRUCTIONS
Take Naproxen 1 tab, twice daily, until all tabs are gone. Space doses 12 hours apart for best effect. TAKE WITH FOOD. Do not take any Advil, Motrin, Aleve or ibuprofen products while taking this medication. It is ok to take acetaminophen (Tylenol) while taking this medication. If you have regular strength tylenol may take 3 tabs up to 3 times daily. If you have extra-strength tylenol may take 2 tabs up to 3 times daily.

## 2022-02-24 ENCOUNTER — HOSPITAL ENCOUNTER (OUTPATIENT)
Dept: GENERAL RADIOLOGY | Age: 77
Discharge: HOME | End: 2022-02-24
Attending: NURSE PRACTITIONER
Payer: MEDICARE

## 2022-02-24 DIAGNOSIS — R07.81 RIB PAIN ON LEFT SIDE: ICD-10-CM

## 2022-02-24 DIAGNOSIS — R22.32 MASS OF LEFT HAND: ICD-10-CM

## 2022-02-24 PROCEDURE — 71101 X-RAY EXAM UNILAT RIBS/CHEST: CPT | Performed by: NURSE PRACTITIONER

## 2022-02-24 PROCEDURE — 73130 X-RAY EXAM OF HAND: CPT | Performed by: NURSE PRACTITIONER

## 2022-03-04 ENCOUNTER — TELEPHONE (OUTPATIENT)
Dept: FAMILY MEDICINE CLINIC | Facility: CLINIC | Age: 77
End: 2022-03-04

## 2022-03-04 NOTE — TELEPHONE ENCOUNTER
Pt cancelled appt due to  having surgery and she needs to be home with him to care for him will call back and reschedule appt at a later date and complete the annual Health assessment questions.

## 2022-03-09 PROBLEM — R63.4 UNINTENTIONAL WEIGHT LOSS: Status: RESOLVED | Noted: 2021-02-19 | Resolved: 2022-03-09

## 2022-03-10 ENCOUNTER — OFFICE VISIT (OUTPATIENT)
Dept: FAMILY MEDICINE CLINIC | Facility: CLINIC | Age: 77
End: 2022-03-10
Payer: MEDICARE

## 2022-03-10 VITALS
RESPIRATION RATE: 18 BRPM | SYSTOLIC BLOOD PRESSURE: 118 MMHG | BODY MASS INDEX: 18.58 KG/M2 | HEIGHT: 67 IN | HEART RATE: 76 BPM | WEIGHT: 118.38 LBS | DIASTOLIC BLOOD PRESSURE: 78 MMHG

## 2022-03-10 DIAGNOSIS — Z00.00 ANNUAL PHYSICAL EXAM: Primary | ICD-10-CM

## 2022-03-10 DIAGNOSIS — K21.9 GASTROESOPHAGEAL REFLUX DISEASE WITHOUT ESOPHAGITIS: ICD-10-CM

## 2022-03-10 DIAGNOSIS — L71.9 ROSACEA: ICD-10-CM

## 2022-03-10 DIAGNOSIS — E78.2 MIXED HYPERLIPIDEMIA: ICD-10-CM

## 2022-03-10 DIAGNOSIS — M46.96 UNSPECIFIED INFLAMMATORY SPONDYLOPATHY, LUMBAR REGION (HCC): ICD-10-CM

## 2022-03-10 DIAGNOSIS — H25.9 AGE-RELATED CATARACT OF BOTH EYES, UNSPECIFIED AGE-RELATED CATARACT TYPE: Chronic | ICD-10-CM

## 2022-03-10 DIAGNOSIS — I25.10 CORONARY ARTERY DISEASE INVOLVING NATIVE CORONARY ARTERY OF NATIVE HEART WITHOUT ANGINA PECTORIS: Chronic | ICD-10-CM

## 2022-03-10 DIAGNOSIS — I77.9 BILATERAL CAROTID ARTERY DISEASE, UNSPECIFIED TYPE (HCC): ICD-10-CM

## 2022-03-10 DIAGNOSIS — E44.0 MODERATE PROTEIN-CALORIE MALNUTRITION (HCC): ICD-10-CM

## 2022-03-10 DIAGNOSIS — J44.9 COPD, MILD (HCC): ICD-10-CM

## 2022-03-10 DIAGNOSIS — E03.9 ACQUIRED HYPOTHYROIDISM: ICD-10-CM

## 2022-03-10 DIAGNOSIS — H16.223 KERATOCONJUNCTIVITIS SICCA OF BOTH EYES NOT SPECIFIED AS SJOGREN'S: ICD-10-CM

## 2022-03-10 DIAGNOSIS — H35.033 HYPERTENSIVE RETINOPATHY OF BOTH EYES: ICD-10-CM

## 2022-03-10 DIAGNOSIS — Z86.718 HISTORY OF VENOUS THROMBOSIS: ICD-10-CM

## 2022-03-10 DIAGNOSIS — Z00.00 ENCOUNTER FOR ANNUAL HEALTH EXAMINATION: ICD-10-CM

## 2022-03-10 DIAGNOSIS — I10 ESSENTIAL HYPERTENSION: ICD-10-CM

## 2022-03-10 DIAGNOSIS — M47.816 ARTHRITIS OF FACET JOINT OF LUMBAR SPINE: ICD-10-CM

## 2022-03-10 DIAGNOSIS — I70.0 THORACIC AORTA ATHEROSCLEROSIS (HCC): ICD-10-CM

## 2022-03-10 DIAGNOSIS — M81.0 AGE-RELATED OSTEOPOROSIS WITHOUT CURRENT PATHOLOGICAL FRACTURE: ICD-10-CM

## 2022-03-10 DIAGNOSIS — I73.9 PVD (PERIPHERAL VASCULAR DISEASE) (HCC): ICD-10-CM

## 2022-03-10 DIAGNOSIS — D50.0 IRON DEFICIENCY ANEMIA DUE TO CHRONIC BLOOD LOSS: Chronic | ICD-10-CM

## 2022-03-10 DIAGNOSIS — E55.9 VITAMIN D DEFICIENCY: ICD-10-CM

## 2022-03-10 PROCEDURE — 96160 PT-FOCUSED HLTH RISK ASSMT: CPT | Performed by: FAMILY MEDICINE

## 2022-03-10 PROCEDURE — 3074F SYST BP LT 130 MM HG: CPT | Performed by: FAMILY MEDICINE

## 2022-03-10 PROCEDURE — G0439 PPPS, SUBSEQ VISIT: HCPCS | Performed by: FAMILY MEDICINE

## 2022-03-10 PROCEDURE — 3078F DIAST BP <80 MM HG: CPT | Performed by: FAMILY MEDICINE

## 2022-03-10 PROCEDURE — 99397 PER PM REEVAL EST PAT 65+ YR: CPT | Performed by: FAMILY MEDICINE

## 2022-03-10 PROCEDURE — 3008F BODY MASS INDEX DOCD: CPT | Performed by: FAMILY MEDICINE

## 2022-03-10 NOTE — ASSESSMENT & PLAN NOTE
Stable, continue present management WBC: 4.4, done on 2/8/2022. HGB: 13.5, done on 2/8/2022. PLT: 213, done on 2/8/2022.

## 2022-03-10 NOTE — ASSESSMENT & PLAN NOTE
Stable, Continue present management.     Thyroid  (most recent labs)   Lab Results   Component Value Date/Time    TSH 1.010 02/11/2021 09:52 AM    T4F 1.2 02/11/2021 09:52 AM    TSHT4 1.35 02/08/2022 11:03 AM         Endocrine Medications          Levothyroxine Sodium 75 MCG Oral Tab

## 2022-03-14 RX ORDER — LEVOTHYROXINE SODIUM 0.07 MG/1
TABLET ORAL
Qty: 90 TABLET | Refills: 0 | Status: SHIPPED | OUTPATIENT
Start: 2022-03-14

## 2022-04-19 RX ORDER — SIMVASTATIN 40 MG
TABLET ORAL
Qty: 90 TABLET | Refills: 0 | Status: SHIPPED | OUTPATIENT
Start: 2022-04-19

## 2022-06-13 DIAGNOSIS — E03.9 ACQUIRED HYPOTHYROIDISM: ICD-10-CM

## 2022-06-14 RX ORDER — LEVOTHYROXINE SODIUM 0.07 MG/1
TABLET ORAL
Qty: 90 TABLET | Refills: 0 | Status: SHIPPED | OUTPATIENT
Start: 2022-06-14

## 2022-07-25 DIAGNOSIS — E78.00 PURE HYPERCHOLESTEROLEMIA: Chronic | ICD-10-CM

## 2022-07-25 DIAGNOSIS — I10 HYPERTENSION GOAL BP (BLOOD PRESSURE) < 140/90: Chronic | ICD-10-CM

## 2022-07-25 RX ORDER — LOSARTAN POTASSIUM 50 MG/1
TABLET ORAL
Qty: 90 TABLET | Refills: 0 | Status: SHIPPED | OUTPATIENT
Start: 2022-07-25

## 2022-07-25 RX ORDER — SIMVASTATIN 40 MG
TABLET ORAL
Qty: 90 TABLET | Refills: 0 | Status: SHIPPED | OUTPATIENT
Start: 2022-07-25

## 2022-07-27 DIAGNOSIS — E78.00 PURE HYPERCHOLESTEROLEMIA: Chronic | ICD-10-CM

## 2022-07-27 DIAGNOSIS — I10 HYPERTENSION GOAL BP (BLOOD PRESSURE) < 140/90: Chronic | ICD-10-CM

## 2022-07-28 ENCOUNTER — TELEPHONE (OUTPATIENT)
Dept: FAMILY MEDICINE CLINIC | Facility: CLINIC | Age: 77
End: 2022-07-28

## 2022-07-28 DIAGNOSIS — M81.0 AGE-RELATED OSTEOPOROSIS WITHOUT CURRENT PATHOLOGICAL FRACTURE: Primary | ICD-10-CM

## 2022-07-28 DIAGNOSIS — E55.9 VITAMIN D DEFICIENCY: ICD-10-CM

## 2022-07-28 NOTE — TELEPHONE ENCOUNTER
Prolia last received 2/23/2022. Last Dexa Scan 2/24/2021. Spoke with Kalee Jluis regarding her Prolia Injection. Lab orders are entered and we discussed having them drawn no more than 2 weeks before her appointment. Although [facility] is in the chart as her preferred lab, Kalee Bazzi would like them entered for [facility]. Preferred lab changed to [facility]. Has appointment 8/25/2022 with Floyd Borrego.     Please order Prolia  Routed to James Brannon RN

## 2022-07-29 RX ORDER — LOSARTAN POTASSIUM 50 MG/1
TABLET ORAL
Qty: 90 TABLET | Refills: 0 | OUTPATIENT
Start: 2022-07-29

## 2022-07-29 RX ORDER — SIMVASTATIN 40 MG
TABLET ORAL
Qty: 90 TABLET | Refills: 0 | OUTPATIENT
Start: 2022-07-29

## 2022-08-02 NOTE — TELEPHONE ENCOUNTER
Requested Prescriptions     Pending Prescriptions Disp Refills   • MONTELUKAST SODIUM 10 MG Oral Tab [Pharmacy Med Name: Montelukast Sodium 10 Mg Tab [facility]] 90 tablet 0     Sig: TAKE ONE TABLET BY MOUTH EVERY EVENING     LOV 11/16/2020     Patient was asked [facility] Well Differentiated Histology Text: Arising from the epidermis is a keratin-producing proliferation of atypical keratinocytes with invasion into the underlying dermis. Mitoses and atypical forms are evident. Intercellular bridge and keratin petros formation are evident.

## 2022-08-03 ENCOUNTER — LAB ENCOUNTER (OUTPATIENT)
Dept: LAB | Age: 77
End: 2022-08-03
Attending: FAMILY MEDICINE
Payer: MEDICARE

## 2022-08-03 DIAGNOSIS — E55.9 VITAMIN D DEFICIENCY: ICD-10-CM

## 2022-08-03 DIAGNOSIS — M81.0 AGE-RELATED OSTEOPOROSIS WITHOUT CURRENT PATHOLOGICAL FRACTURE: ICD-10-CM

## 2022-08-03 LAB
CALCIUM BLD-MCNC: 9.8 MG/DL (ref 8.5–10.1)
CREAT BLD-MCNC: 0.93 MG/DL
GFR SERPLBLD BASED ON 1.73 SQ M-ARVRAT: 63 ML/MIN/1.73M2 (ref 60–?)
MAGNESIUM SERPL-MCNC: 2.3 MG/DL (ref 1.6–2.6)
PHOSPHATE SERPL-MCNC: 3.3 MG/DL (ref 2.5–4.9)
VIT D+METAB SERPL-MCNC: 34.1 NG/ML (ref 30–100)

## 2022-08-03 PROCEDURE — 36415 COLL VENOUS BLD VENIPUNCTURE: CPT

## 2022-08-03 PROCEDURE — 84100 ASSAY OF PHOSPHORUS: CPT

## 2022-08-03 PROCEDURE — 82306 VITAMIN D 25 HYDROXY: CPT

## 2022-08-03 PROCEDURE — 82565 ASSAY OF CREATININE: CPT

## 2022-08-03 PROCEDURE — 82310 ASSAY OF CALCIUM: CPT

## 2022-08-03 PROCEDURE — 83735 ASSAY OF MAGNESIUM: CPT

## 2022-08-25 ENCOUNTER — OFFICE VISIT (OUTPATIENT)
Dept: FAMILY MEDICINE CLINIC | Facility: CLINIC | Age: 77
End: 2022-08-25
Payer: MEDICARE

## 2022-08-25 VITALS
RESPIRATION RATE: 16 BRPM | OXYGEN SATURATION: 99 % | HEIGHT: 67 IN | BODY MASS INDEX: 19.15 KG/M2 | HEART RATE: 68 BPM | SYSTOLIC BLOOD PRESSURE: 104 MMHG | DIASTOLIC BLOOD PRESSURE: 60 MMHG | WEIGHT: 122 LBS | TEMPERATURE: 98 F

## 2022-08-25 DIAGNOSIS — M25.522 ELBOW PAIN, LEFT: ICD-10-CM

## 2022-08-25 DIAGNOSIS — R06.02 SHORTNESS OF BREATH: ICD-10-CM

## 2022-08-25 DIAGNOSIS — M81.0 AGE-RELATED OSTEOPOROSIS WITHOUT CURRENT PATHOLOGICAL FRACTURE: ICD-10-CM

## 2022-08-25 DIAGNOSIS — I25.10 CORONARY ARTERY DISEASE INVOLVING NATIVE CORONARY ARTERY OF NATIVE HEART WITHOUT ANGINA PECTORIS: Primary | ICD-10-CM

## 2022-08-25 DIAGNOSIS — R61 DIAPHORESIS: ICD-10-CM

## 2022-08-25 PROCEDURE — 3008F BODY MASS INDEX DOCD: CPT | Performed by: FAMILY MEDICINE

## 2022-08-25 PROCEDURE — 96372 THER/PROPH/DIAG INJ SC/IM: CPT | Performed by: FAMILY MEDICINE

## 2022-08-25 PROCEDURE — 3074F SYST BP LT 130 MM HG: CPT | Performed by: FAMILY MEDICINE

## 2022-08-25 PROCEDURE — 99214 OFFICE O/P EST MOD 30 MIN: CPT | Performed by: FAMILY MEDICINE

## 2022-08-25 PROCEDURE — 3078F DIAST BP <80 MM HG: CPT | Performed by: FAMILY MEDICINE

## 2022-09-08 ENCOUNTER — TELEPHONE (OUTPATIENT)
Dept: FAMILY MEDICINE CLINIC | Facility: CLINIC | Age: 77
End: 2022-09-08

## 2022-09-08 NOTE — TELEPHONE ENCOUNTER
Patient called states she had a covid test and stress test scheduled that she had to cancel due to a death in the family. Patient said she tried calling to reschedule and they can not find any orders from Dr. Amber Ortiz. Pt wants to know if orders need to be re entered?

## 2022-09-08 NOTE — TELEPHONE ENCOUNTER
Called central scheduling and she has an appointment for a lexiscan and she does not need a covid test for this I called her and notified her of this she is ready for the test

## 2022-09-11 DIAGNOSIS — E03.9 ACQUIRED HYPOTHYROIDISM: ICD-10-CM

## 2022-09-14 RX ORDER — LEVOTHYROXINE SODIUM 0.07 MG/1
TABLET ORAL
Qty: 90 TABLET | Refills: 0 | Status: SHIPPED | OUTPATIENT
Start: 2022-09-14

## 2022-09-15 ENCOUNTER — HOSPITAL ENCOUNTER (OUTPATIENT)
Dept: CV DIAGNOSTICS | Facility: HOSPITAL | Age: 77
Discharge: HOME | End: 2022-09-15
Attending: FAMILY MEDICINE
Payer: MEDICARE

## 2022-09-15 DIAGNOSIS — R06.02 SHORTNESS OF BREATH: ICD-10-CM

## 2022-09-15 DIAGNOSIS — I25.10 CORONARY ARTERY DISEASE INVOLVING NATIVE CORONARY ARTERY OF NATIVE HEART WITHOUT ANGINA PECTORIS: ICD-10-CM

## 2022-09-15 DIAGNOSIS — R61 DIAPHORESIS: ICD-10-CM

## 2022-09-15 PROCEDURE — 78452 HT MUSCLE IMAGE SPECT MULT: CPT | Performed by: FAMILY MEDICINE

## 2022-09-15 PROCEDURE — 93017 CV STRESS TEST TRACING ONLY: CPT | Performed by: FAMILY MEDICINE

## 2022-09-15 PROCEDURE — 93018 CV STRESS TEST I&R ONLY: CPT | Performed by: FAMILY MEDICINE

## 2022-09-19 DIAGNOSIS — R94.31 ABNORMAL EKG: Primary | ICD-10-CM

## 2022-09-20 ENCOUNTER — TELEPHONE (OUTPATIENT)
Dept: FAMILY MEDICINE CLINIC | Facility: CLINIC | Age: 77
End: 2022-09-20

## 2022-09-20 NOTE — TELEPHONE ENCOUNTER
Called and talked to patient and told her what Dr Geraldo Narayan wanted her to know she will call central scheduling to set up the EKG when she gets home.

## 2022-09-21 ENCOUNTER — EKG ENCOUNTER (OUTPATIENT)
Dept: LAB | Age: 77
End: 2022-09-21
Attending: FAMILY MEDICINE

## 2022-09-21 DIAGNOSIS — R94.31 ABNORMAL EKG: ICD-10-CM

## 2022-09-21 LAB
ATRIAL RATE: 74 BPM
P-R INTERVAL: 184 MS
Q-T INTERVAL: 378 MS
QRS DURATION: 66 MS
QTC CALCULATION (BEZET): 419 MS
R AXIS: 66 DEGREES
T AXIS: 22 DEGREES
VENTRICULAR RATE: 74 BPM

## 2022-09-21 PROCEDURE — 93005 ELECTROCARDIOGRAM TRACING: CPT

## 2022-09-21 PROCEDURE — 93010 ELECTROCARDIOGRAM REPORT: CPT | Performed by: INTERNAL MEDICINE

## 2022-09-22 DIAGNOSIS — R94.31 ABNORMAL EKG: Primary | ICD-10-CM

## 2022-09-23 NOTE — TELEPHONE ENCOUNTER
Called and talked to patient she is very busy getting ready for her  to come home from rehab she does not believe he will have assistance and is very upset she will do holter when things settle down.

## 2022-09-26 ENCOUNTER — PATIENT MESSAGE (OUTPATIENT)
Dept: FAMILY MEDICINE CLINIC | Facility: CLINIC | Age: 77
End: 2022-09-26

## 2022-10-25 ENCOUNTER — TELEPHONE (OUTPATIENT)
Dept: FAMILY MEDICINE CLINIC | Facility: CLINIC | Age: 77
End: 2022-10-25

## 2022-10-25 DIAGNOSIS — E78.00 PURE HYPERCHOLESTEROLEMIA: Chronic | ICD-10-CM

## 2022-10-25 DIAGNOSIS — I10 HYPERTENSION GOAL BP (BLOOD PRESSURE) < 140/90: Chronic | ICD-10-CM

## 2022-10-31 RX ORDER — SIMVASTATIN 40 MG
TABLET ORAL
Qty: 90 TABLET | Refills: 0 | Status: SHIPPED | OUTPATIENT
Start: 2022-10-31

## 2022-10-31 RX ORDER — LOSARTAN POTASSIUM 50 MG/1
TABLET ORAL
Qty: 90 TABLET | Refills: 0 | Status: SHIPPED | OUTPATIENT
Start: 2022-10-31

## 2022-10-31 NOTE — TELEPHONE ENCOUNTER
LOV 08/25/2022 NOTED  RTC in 3 months for med check  Renata Varela, DO  8/25/2022    No follow-up scheduled   Please assist with scheduling appt with Dr Norman Calderón around Nov 25th for med check

## 2022-11-01 NOTE — TELEPHONE ENCOUNTER
Left message asking patent to call and schedule a med check appointment around Nov 25 with Maria Teresa

## 2022-11-02 DIAGNOSIS — I10 HYPERTENSION GOAL BP (BLOOD PRESSURE) < 140/90: Chronic | ICD-10-CM

## 2022-11-02 DIAGNOSIS — E78.00 PURE HYPERCHOLESTEROLEMIA: Chronic | ICD-10-CM

## 2022-11-09 RX ORDER — SIMVASTATIN 40 MG
TABLET ORAL
Qty: 90 TABLET | Refills: 1 | Status: SHIPPED | OUTPATIENT
Start: 2022-11-09

## 2022-11-09 RX ORDER — LOSARTAN POTASSIUM 50 MG/1
TABLET ORAL
Qty: 90 TABLET | Refills: 1 | Status: SHIPPED | OUTPATIENT
Start: 2022-11-09

## 2022-11-18 ENCOUNTER — OFFICE VISIT (OUTPATIENT)
Dept: FAMILY MEDICINE CLINIC | Facility: CLINIC | Age: 77
End: 2022-11-18
Payer: MEDICARE

## 2022-11-18 VITALS
DIASTOLIC BLOOD PRESSURE: 80 MMHG | WEIGHT: 120.19 LBS | HEIGHT: 67 IN | RESPIRATION RATE: 18 BRPM | BODY MASS INDEX: 18.87 KG/M2 | HEART RATE: 70 BPM | SYSTOLIC BLOOD PRESSURE: 118 MMHG

## 2022-11-18 DIAGNOSIS — E03.9 ACQUIRED HYPOTHYROIDISM: ICD-10-CM

## 2022-11-18 DIAGNOSIS — I10 ESSENTIAL HYPERTENSION: ICD-10-CM

## 2022-11-18 DIAGNOSIS — L98.9 LESION OF SKIN OF NOSE: ICD-10-CM

## 2022-11-18 DIAGNOSIS — E44.0 MODERATE PROTEIN-CALORIE MALNUTRITION (HCC): ICD-10-CM

## 2022-11-18 DIAGNOSIS — I77.9 BILATERAL CAROTID ARTERY DISEASE, UNSPECIFIED TYPE (HCC): ICD-10-CM

## 2022-11-18 DIAGNOSIS — Z78.0 ASYMPTOMATIC MENOPAUSE: ICD-10-CM

## 2022-11-18 DIAGNOSIS — E78.2 MIXED HYPERLIPIDEMIA: Primary | ICD-10-CM

## 2022-11-18 PROCEDURE — 99214 OFFICE O/P EST MOD 30 MIN: CPT | Performed by: FAMILY MEDICINE

## 2022-11-18 PROCEDURE — 3079F DIAST BP 80-89 MM HG: CPT | Performed by: FAMILY MEDICINE

## 2022-11-18 PROCEDURE — 3074F SYST BP LT 130 MM HG: CPT | Performed by: FAMILY MEDICINE

## 2022-11-18 PROCEDURE — 3008F BODY MASS INDEX DOCD: CPT | Performed by: FAMILY MEDICINE

## 2022-11-18 NOTE — ASSESSMENT & PLAN NOTE
Stable, Continue present management.     Thyroid  (most recent labs)   Lab Results   Component Value Date/Time    TSH 1.010 02/11/2021 09:52 AM    T4F 1.2 02/11/2021 09:52 AM    TSHT4 1.35 02/08/2022 11:03 AM         Endocrine Medications          LEVOTHYROXINE 75 MCG Oral Tab

## 2022-12-19 DIAGNOSIS — E03.9 ACQUIRED HYPOTHYROIDISM: ICD-10-CM

## 2022-12-19 RX ORDER — LEVOTHYROXINE SODIUM 0.07 MG/1
TABLET ORAL
Qty: 90 TABLET | Refills: 0 | Status: SHIPPED | OUTPATIENT
Start: 2022-12-19

## 2023-01-19 ENCOUNTER — TELEPHONE (OUTPATIENT)
Dept: FAMILY MEDICINE CLINIC | Facility: CLINIC | Age: 78
End: 2023-01-19

## 2023-01-19 NOTE — TELEPHONE ENCOUNTER
Patient completed medical records request form requesting all medical records for continuation of care as they are relocating and will be switching practices. Sp[facility] with Manoj Galindo at [facility] and she stated that they had access to her records and we didn't need to send any records.

## 2023-01-23 ENCOUNTER — TELEPHONE (OUTPATIENT)
Dept: FAMILY MEDICINE CLINIC | Facility: CLINIC | Age: 78
End: 2023-01-23

## 2023-01-23 NOTE — TELEPHONE ENCOUNTER
Spoke with Pankaj Garcia. She is no longer a patient of  or [facility]. She and her  are retired teachers and an insurance change was made. Pankaj Garcia said she had no say in the transfer of insurance. 1/19/2023 Pankaj Garcia completed a request for transfer of records. She will no longer be receiving Prolia at this office. I reminded her that he last dose was 8/25/2022 and her next dose is due February 2023. Pankaj Garcia verbalized understanding.

## 2023-03-13 NOTE — TELEPHONE ENCOUNTER
Medication refilled per protocol.      Requested Prescriptions     Signed Prescriptions Disp Refills   • LOSARTAN POTASSIUM 50 MG Oral Tab 90 tablet 1     Sig: TAKE ONE TABLET BY MOUTH DAILY     Authorizing Provider: Chucho Duque     Ordering User: Marti Gonzales Complex Repair And Transposition Flap Text: The defect edges were debeveled with a #15 scalpel blade.  The primary defect was closed partially with a complex linear closure.  Given the location of the remaining defect, shape of the defect and the proximity to free margins a transposition flap was deemed most appropriate for complete closure of the defect.  Using a sterile surgical marker, an appropriate advancement flap was drawn incorporating the defect and placing the expected incisions within the relaxed skin tension lines where possible.    The area thus outlined was incised deep to adipose tissue with a #15 scalpel blade.  The skin margins were undermined to an appropriate distance in all directions utilizing iris scissors.

## 2023-09-26 NOTE — ASSESSMENT & PLAN NOTE
Patient was seen in the ER and Binh Salazar is asking for him to see Carlos Puga this week. He is frustrated that no one gave him anything for pain. I have told him several times that he will need to contact the ER and he said he has tried but no one has answered. He states he is crippled and cant move and cant even come to an appointment with Dr. Carlos Puga until he gets something for pain. He has no way to get here. He asked me several times to contact whoever saw him in the ER but I told him I don't have a way to get in touch with them. I told him I would send a message to Binh Salazar since he is the one who was consulted from the ER. I tried explaining to him that our hands are tied until he is seen. We can't legally prescribe anything from our office and that he will need to be seen here. He asked me to send a message to Binh Salazar so that's what I am doing. Stable per continue present management .  Vandana Gutierres MD

## (undated) DIAGNOSIS — E78.2 MIXED HYPERLIPIDEMIA: ICD-10-CM

## (undated) DIAGNOSIS — E55.9 VITAMIN D DEFICIENCY: ICD-10-CM

## (undated) DIAGNOSIS — M81.0 AGE-RELATED OSTEOPOROSIS WITHOUT CURRENT PATHOLOGICAL FRACTURE: Primary | ICD-10-CM

## (undated) DIAGNOSIS — D50.9 IRON DEFICIENCY ANEMIA, UNSPECIFIED IRON DEFICIENCY ANEMIA TYPE: ICD-10-CM

## (undated) DIAGNOSIS — E03.9 ACQUIRED HYPOTHYROIDISM: ICD-10-CM

## (undated) DIAGNOSIS — E78.00 PURE HYPERCHOLESTEROLEMIA: Chronic | ICD-10-CM

## (undated) DIAGNOSIS — E44.0 MODERATE PROTEIN-CALORIE MALNUTRITION (HCC): ICD-10-CM

## (undated) DIAGNOSIS — I25.10 CORONARY ARTERY DISEASE INVOLVING NATIVE CORONARY ARTERY OF NATIVE HEART WITHOUT ANGINA PECTORIS: ICD-10-CM

## (undated) NOTE — LETTER
PATIENT NAME: Ishmael Mcconnell   : 11/15/1945       Waiver      DATE: 2020     Dear Patient,    Thank you for choosing [facility] as your health care provider. Your physician has deemed the following medical service(s) necessary.   Fauzia

## (undated) NOTE — LETTER
July 19, 2017    [phone]      Dear Candelaria Crum: It was a pleasure speaking with you over the phone recently.  To follow up, I wanted to send you my contact information to utilize when you have a question

## (undated) NOTE — MR AVS SNAPSHOT
[address] [facility]  [address] [place] [address] [phone]               Thank you for choosing us for your health care visit with Nata Franco MD.  We are glad to serve you and happy to provide you with this summa OPHTHALMOLOGY - INTERNAL [[id number] CUSTOM]  Order #:  [id number]         **REFERRAL REQUEST**    Your physician has referred you to a specialist.  Your physician or the clinic staff will provide you with the phone number you should call to schedule your ap [facility]  Annually for Diabetics No results found for: A1C, HGBA1C No flowsheet data found.     Fasting Blood Sugar (FSB)   Patient must be diagnosed with one of the following:   • Hypertension   • Dyslipidemia   • Obesity (BMI ³30 kg/m2)   • Previous eleva Covered Annually No results found for: FOB, OCCULTSTOOL No flowsheet data found.      Barium Enema-   uncomfortable but covered  Covered but uncomfortable   Glaucoma Screening      Ophthalmology Visit   Covered annually for Diabetics, people with Glaucoma f Please get once after your 65th birthday    Hepatitis B for Moderate/High Risk       No orders found for this or any previous visit.  Medium/high risk factors:   End-stage renal disease   Hemophiliacs who received Factor VIII or IX concentrates   Clients of Serevent [Salmeterol Xinafoate] Coughing    Given by pulmonologist also caused increased mucous    Sulfa Antibiotics     Convulsions    Symbicort Rash, Itching    AERO    Triamterene Coughing    HCTZ TABS    Nexium     \" Ribs and Chest muscle soreness\" Where to Get Your Medications      These medications were sent to [address] [facility] [address] [phone], [phone]  [address] [facility]     Phone:  [phone]    - Albuterol Sulfate  (33 Base)